# Patient Record
Sex: FEMALE | Employment: UNEMPLOYED | URBAN - METROPOLITAN AREA
[De-identification: names, ages, dates, MRNs, and addresses within clinical notes are randomized per-mention and may not be internally consistent; named-entity substitution may affect disease eponyms.]

---

## 2019-10-29 ENCOUNTER — HOSPITAL ENCOUNTER (OUTPATIENT)
Dept: MRI IMAGING | Age: 54
Discharge: HOME | End: 2019-10-29
Attending: INTERNAL MEDICINE
Payer: COMMERCIAL

## 2019-10-29 DIAGNOSIS — J44.9 COPD (CHRONIC OBSTRUCTIVE PULMONARY DISEASE) (HCC): ICD-10-CM

## 2019-10-29 DIAGNOSIS — Z87.74 S/P TOF (TETRALOGY OF FALLOT) REPAIR: ICD-10-CM

## 2019-10-29 PROCEDURE — 75557 CARDIAC MRI FOR MORPH: CPT

## 2020-02-04 LAB
HBA1C MFR BLD HPLC: 9.1 %
HBA1C MFR BLD HPLC: 9.1 %

## 2020-08-26 VITALS
HEART RATE: 82 BPM | OXYGEN SATURATION: 92 % | TEMPERATURE: 97.5 F | DIASTOLIC BLOOD PRESSURE: 87 MMHG | WEIGHT: 277.5 LBS | BODY MASS INDEX: 49.17 KG/M2 | HEIGHT: 63 IN | SYSTOLIC BLOOD PRESSURE: 150 MMHG

## 2020-08-26 PROBLEM — I10 ESSENTIAL HYPERTENSION: Status: ACTIVE | Noted: 2020-08-26

## 2020-08-26 PROBLEM — E78.2 MIXED HYPERLIPIDEMIA: Status: ACTIVE | Noted: 2020-08-26

## 2020-08-26 PROBLEM — G47.33 OBSTRUCTIVE SLEEP APNEA OF ADULT: Status: ACTIVE | Noted: 2020-08-26

## 2020-08-26 PROBLEM — E78.00 HYPERCHOLESTEROLEMIA: Status: ACTIVE | Noted: 2020-08-26

## 2020-08-26 PROBLEM — J44.9 CHRONIC OBSTRUCTIVE LUNG DISEASE (HCC): Status: ACTIVE | Noted: 2020-08-26

## 2020-08-26 PROBLEM — N83.209 CYST OF OVARY: Status: ACTIVE | Noted: 2020-08-26

## 2020-08-26 PROBLEM — N80.9 ENDOMETRIOSIS: Status: ACTIVE | Noted: 2020-08-26

## 2020-08-26 PROBLEM — L60.0 INGROWN TOENAIL: Status: ACTIVE | Noted: 2020-08-26

## 2020-08-26 PROBLEM — Z79.52 LONG TERM (CURRENT) USE OF SYSTEMIC STEROIDS: Status: ACTIVE | Noted: 2020-08-26

## 2020-08-26 PROBLEM — E66.01 MORBID OBESITY (HCC): Status: ACTIVE | Noted: 2020-08-26

## 2020-08-26 PROBLEM — Q21.3 TETRALOGY OF FALLOT: Status: ACTIVE | Noted: 2020-08-26

## 2020-08-26 PROBLEM — H66.90 CHRONIC OTITIS MEDIA: Status: ACTIVE | Noted: 2020-08-26

## 2020-08-26 PROBLEM — E11.9 DIABETES MELLITUS TYPE 2, CONTROLLED (HCC): Status: ACTIVE | Noted: 2020-08-26

## 2020-08-26 RX ORDER — CIPROFLOXACIN AND DEXAMETHASONE 3; 1 MG/ML; MG/ML
4 SUSPENSION/ DROPS AURICULAR (OTIC) 2 TIMES DAILY
COMMUNITY
End: 2020-11-25 | Stop reason: ALTCHOICE

## 2020-08-26 RX ORDER — CHOLECALCIFEROL (VITAMIN D3) 125 MCG
CAPSULE ORAL
COMMUNITY

## 2020-08-26 RX ORDER — TOBRAMYCIN AND DEXAMETHASONE 3; 1 MG/ML; MG/ML
1 SUSPENSION/ DROPS OPHTHALMIC
COMMUNITY
End: 2020-11-25 | Stop reason: ALTCHOICE

## 2020-08-26 RX ORDER — ALBUTEROL SULFATE 2.5 MG/.5ML
SOLUTION RESPIRATORY (INHALATION) ONCE
COMMUNITY
End: 2021-02-05 | Stop reason: ALTCHOICE

## 2020-08-26 RX ORDER — GLIPIZIDE 5 MG/1
TABLET ORAL 2 TIMES DAILY
COMMUNITY
End: 2020-11-25 | Stop reason: ALTCHOICE

## 2020-08-26 RX ORDER — SULFACETAMIDE SODIUM 100 MG/ML
1 SOLUTION/ DROPS OPHTHALMIC
COMMUNITY
End: 2020-11-25 | Stop reason: ALTCHOICE

## 2020-08-26 RX ORDER — MELOXICAM 7.5 MG/1
TABLET ORAL DAILY
COMMUNITY
End: 2020-11-25 | Stop reason: ALTCHOICE

## 2020-08-26 RX ORDER — FLUCONAZOLE 150 MG/1
150 TABLET ORAL DAILY
COMMUNITY
End: 2020-11-25 | Stop reason: ALTCHOICE

## 2020-08-26 RX ORDER — PEN NEEDLE, DIABETIC 30 GX3/16"
NEEDLE, DISPOSABLE MISCELLANEOUS
COMMUNITY
End: 2020-11-25 | Stop reason: SDUPTHER

## 2020-08-26 RX ORDER — LANCETS 33 GAUGE
EACH MISCELLANEOUS
COMMUNITY
End: 2020-11-25 | Stop reason: SDUPTHER

## 2020-08-26 RX ORDER — PREDNISONE 5 MG/1
TABLET ORAL
COMMUNITY
End: 2020-11-25 | Stop reason: ALTCHOICE

## 2020-08-26 RX ORDER — AMOXICILLIN AND CLAVULANATE POTASSIUM 875; 125 MG/1; MG/1
TABLET, FILM COATED ORAL EVERY 12 HOURS
COMMUNITY
End: 2020-11-25 | Stop reason: ALTCHOICE

## 2020-08-26 RX ORDER — INFLUENZA VIRUS VACCINE 15; 15; 15; 15 UG/.5ML; UG/.5ML; UG/.5ML; UG/.5ML
0.5 SUSPENSION INTRAMUSCULAR
COMMUNITY
End: 2020-11-25 | Stop reason: ALTCHOICE

## 2020-08-26 RX ORDER — METFORMIN HYDROCHLORIDE 500 MG/1
TABLET, FILM COATED, EXTENDED RELEASE ORAL
COMMUNITY
End: 2020-11-25 | Stop reason: ALTCHOICE

## 2020-08-26 RX ORDER — LEVOFLOXACIN 500 MG/1
TABLET, FILM COATED ORAL DAILY
COMMUNITY
End: 2021-08-26 | Stop reason: ALTCHOICE

## 2020-08-26 RX ORDER — LOSARTAN POTASSIUM 100 MG/1
100 TABLET ORAL DAILY
COMMUNITY

## 2020-08-26 RX ORDER — GLIMEPIRIDE 2 MG/1
TABLET ORAL
COMMUNITY
End: 2020-11-25 | Stop reason: SDUPTHER

## 2020-08-26 RX ORDER — PROMETHAZINE HYDROCHLORIDE AND CODEINE PHOSPHATE 6.25; 1 MG/5ML; MG/5ML
SOLUTION ORAL
COMMUNITY
End: 2020-11-25 | Stop reason: ALTCHOICE

## 2020-08-26 RX ORDER — METHYLPREDNISOLONE 4 MG/1
TABLET ORAL
COMMUNITY
End: 2020-11-25 | Stop reason: ALTCHOICE

## 2020-08-26 RX ORDER — CEFDINIR 300 MG/1
300 CAPSULE ORAL 2 TIMES DAILY
COMMUNITY
End: 2020-11-25 | Stop reason: ALTCHOICE

## 2020-08-26 RX ORDER — DOXYCYCLINE 100 MG/1
100 CAPSULE ORAL 2 TIMES DAILY
COMMUNITY
End: 2020-11-25 | Stop reason: ALTCHOICE

## 2020-08-26 RX ORDER — METFORMIN HYDROCHLORIDE 500 MG/1
TABLET ORAL 2 TIMES DAILY WITH MEALS
COMMUNITY
End: 2020-11-25 | Stop reason: ALTCHOICE

## 2020-08-26 RX ORDER — CHLORPHENIRAMINE MALEATE 4 MG
TABLET ORAL 2 TIMES DAILY
COMMUNITY
End: 2020-11-25 | Stop reason: ALTCHOICE

## 2020-08-26 RX ORDER — INSULIN GLARGINE 100 [IU]/ML
INJECTION, SOLUTION SUBCUTANEOUS
COMMUNITY
End: 2020-09-29 | Stop reason: SDUPTHER

## 2020-08-26 RX ORDER — FLUOCINOLONE ACETONIDE 0.11 MG/ML
OIL AURICULAR (OTIC)
COMMUNITY
End: 2020-11-25 | Stop reason: ALTCHOICE

## 2020-08-26 RX ORDER — SILVER SULFADIAZINE 10 G/1000G
CREAM TOPICAL DAILY
COMMUNITY
End: 2020-11-25 | Stop reason: ALTCHOICE

## 2020-08-26 RX ORDER — IPRATROPIUM BROMIDE AND ALBUTEROL SULFATE 2.5; .5 MG/3ML; MG/3ML
3 SOLUTION RESPIRATORY (INHALATION)
COMMUNITY
End: 2020-11-25 | Stop reason: ALTCHOICE

## 2020-08-26 RX ORDER — FLUCONAZOLE 100 MG/1
200 TABLET ORAL DAILY
COMMUNITY
End: 2020-11-25 | Stop reason: ALTCHOICE

## 2020-08-26 RX ORDER — ZOLPIDEM TARTRATE 12.5 MG/1
12.5 TABLET, FILM COATED, EXTENDED RELEASE ORAL
COMMUNITY
End: 2022-09-16

## 2020-08-26 RX ORDER — NEBIVOLOL HYDROCHLORIDE 5 MG/1
TABLET ORAL DAILY
COMMUNITY

## 2020-08-26 RX ORDER — ZOSTER VACCINE RECOMBINANT, ADJUVANTED 50 MCG/0.5
0.5 KIT INTRAMUSCULAR ONCE
COMMUNITY
End: 2020-11-25 | Stop reason: ALTCHOICE

## 2020-08-26 RX ORDER — UMECLIDINIUM BROMIDE AND VILANTEROL TRIFENATATE 62.5; 25 UG/1; UG/1
1 POWDER RESPIRATORY (INHALATION) DAILY
COMMUNITY
End: 2020-11-25 | Stop reason: ALTCHOICE

## 2020-08-26 RX ORDER — ALBUTEROL SULFATE 90 UG/1
AEROSOL, METERED RESPIRATORY (INHALATION)
COMMUNITY

## 2020-08-26 RX ORDER — PREDNISONE 20 MG/1
TABLET ORAL
COMMUNITY
End: 2020-11-25 | Stop reason: ALTCHOICE

## 2020-08-26 RX ORDER — FUROSEMIDE 20 MG/1
TABLET ORAL DAILY
COMMUNITY
End: 2021-02-05 | Stop reason: ALTCHOICE

## 2020-08-26 RX ORDER — CIPROFLOXACIN 500 MG/1
TABLET ORAL 2 TIMES DAILY
COMMUNITY
End: 2020-11-25 | Stop reason: ALTCHOICE

## 2020-08-26 RX ORDER — CYCLOBENZAPRINE HCL 10 MG
TABLET ORAL
COMMUNITY
End: 2022-01-10

## 2020-08-26 RX ORDER — ATENOLOL 50 MG/1
TABLET ORAL DAILY
COMMUNITY
End: 2020-11-25 | Stop reason: ALTCHOICE

## 2020-08-26 RX ORDER — BLOOD SUGAR DIAGNOSTIC
STRIP MISCELLANEOUS SEE ADMIN INSTRUCTIONS
COMMUNITY
End: 2020-11-25 | Stop reason: SDUPTHER

## 2020-08-26 RX ORDER — FLUTICASONE PROPIONATE 110 UG/1
AEROSOL, METERED RESPIRATORY (INHALATION)
COMMUNITY
End: 2020-11-25 | Stop reason: ALTCHOICE

## 2020-08-26 RX ORDER — ERGOCALCIFEROL 1.25 MG/1
50000 CAPSULE ORAL
COMMUNITY
End: 2020-11-25 | Stop reason: ALTCHOICE

## 2020-08-26 RX ORDER — ATORVASTATIN CALCIUM 40 MG/1
TABLET, FILM COATED ORAL DAILY
COMMUNITY
End: 2021-02-05 | Stop reason: DRUGHIGH

## 2020-08-26 RX ORDER — METRONIDAZOLE 500 MG/1
TABLET ORAL 3 TIMES DAILY
COMMUNITY
End: 2020-11-25 | Stop reason: ALTCHOICE

## 2020-09-10 ENCOUNTER — TELEPHONE (OUTPATIENT)
Dept: ENDOCRINOLOGY | Age: 55
End: 2020-09-10

## 2020-09-10 LAB
CHOLEST SERPL-MCNC: 188 MG/DL (ref 100–199)
HBA1C MFR BLD: 8.9 % (ref 4.8–5.6)
HDLC SERPL-MCNC: 46 MG/DL
LDLC SERPL CALC-MCNC: 114 MG/DL (ref 0–99)
TRIGL SERPL-MCNC: 159 MG/DL (ref 0–149)
VLDLC SERPL CALC-MCNC: 28 MG/DL (ref 5–40)

## 2020-09-10 NOTE — TELEPHONE ENCOUNTER
I returned the patient call from yesterday. She said that she had her labs done and wanted to know if her results were back. I advised her that they are in and that I will give her a call when Dr. Stephane Bangura sends me her results.

## 2020-09-18 NOTE — PROGRESS NOTES
Please inform patient that her A1C is 8.9%. It was 9.1% in 2/2020. Please bring glucose log to next visit. Bad cholesterol is better than before but still higher than we target. Make sure to take atorvastatin 40 mg regularly. It should be taken at bedtime and work on low fat diet.

## 2020-09-23 ENCOUNTER — TELEPHONE (OUTPATIENT)
Dept: ENDOCRINOLOGY | Age: 55
End: 2020-09-23

## 2020-09-23 DIAGNOSIS — E11.65 TYPE 2 DIABETES MELLITUS WITH HYPERGLYCEMIA, WITH LONG-TERM CURRENT USE OF INSULIN (HCC): Primary | ICD-10-CM

## 2020-09-23 DIAGNOSIS — Z79.4 TYPE 2 DIABETES MELLITUS WITH HYPERGLYCEMIA, WITH LONG-TERM CURRENT USE OF INSULIN (HCC): Primary | ICD-10-CM

## 2020-09-23 NOTE — TELEPHONE ENCOUNTER
Patient said that her sugar in the mornings run between 140-180. Her sugar was 285 at 6pm the other night. She said that she was seen at [facility] for chest pain, they adjusted her meds and then told her to continue all her meds that you have her on. She's taking 15 units of basaglar, she takes her glimepiride like she is suppose to but isnt sure why her sugar is running high. She said that she have been working on her diet.    ----- Message from Jessica Lennon MD sent at 9/18/2020  2:19 PM EDT -----  Please inform patient that her A1C is 8.9%. It was 9.1% in 2/2020. Please bring glucose log to next visit. Bad cholesterol is better than before but still higher than we target. Make sure to take atorvastatin 40 mg regularly. It should be taken at bedtime and work on low fat diet.

## 2020-09-24 NOTE — TELEPHONE ENCOUNTER
She said that you took her off the Glipizide 5mg and started her on the Glimepiride due to the glipizide causing her stomach pains. She said that we may have to notify [facility] of her increase of insulin due to her increase in units.

## 2020-09-24 NOTE — TELEPHONE ENCOUNTER
Please advise patient to increase Basaglar to 20 units daily. Continue glipizide 5 mg twice a day. Check glucose twice a day everyday: before breakfast and lunch and bring meter/log to next appointment (please set up appointment for 11/2020).

## 2020-09-29 RX ORDER — INSULIN GLARGINE 100 [IU]/ML
INJECTION, SOLUTION SUBCUTANEOUS
Qty: 2 ADJUSTABLE DOSE PRE-FILLED PEN SYRINGE | Refills: 2 | Status: SHIPPED | OUTPATIENT
Start: 2020-09-29 | End: 2020-11-25 | Stop reason: SDUPTHER

## 2020-11-06 ENCOUNTER — TELEPHONE (OUTPATIENT)
Dept: ENDOCRINOLOGY | Age: 55
End: 2020-11-06

## 2020-11-06 NOTE — TELEPHONE ENCOUNTER
Called patient to see if she picked up her new insulin script and if she was tolerating it well. Patient said yes.    ----- Message from Claude Peralta MD sent at 9/18/2020  2:19 PM EDT -----  Please inform patient that her A1C is 8.9%. It was 9.1% in 2/2020. Please bring glucose log to next visit. Bad cholesterol is better than before but still higher than we target. Make sure to take atorvastatin 40 mg regularly. It should be taken at bedtime and work on low fat diet.

## 2020-11-16 VITALS
OXYGEN SATURATION: 92 % | TEMPERATURE: 97.5 F | WEIGHT: 277.5 LBS | BODY MASS INDEX: 49.17 KG/M2 | HEART RATE: 82 BPM | SYSTOLIC BLOOD PRESSURE: 150 MMHG | DIASTOLIC BLOOD PRESSURE: 87 MMHG | HEIGHT: 63 IN

## 2020-11-16 VITALS
SYSTOLIC BLOOD PRESSURE: 148 MMHG | HEART RATE: 84 BPM | WEIGHT: 275.4 LBS | BODY MASS INDEX: 47.02 KG/M2 | HEIGHT: 64 IN | DIASTOLIC BLOOD PRESSURE: 82 MMHG | OXYGEN SATURATION: 94 %

## 2020-11-16 PROBLEM — G47.33 OBSTRUCTIVE SLEEP APNEA OF ADULT: Status: ACTIVE | Noted: 2020-11-16

## 2020-11-16 PROBLEM — E66.01 MORBID OBESITY (HCC): Status: ACTIVE | Noted: 2020-11-16

## 2020-11-16 PROBLEM — I10 ESSENTIAL HYPERTENSION: Status: ACTIVE | Noted: 2020-11-16

## 2020-11-16 PROBLEM — L60.0 INGROWING TOENAIL: Status: ACTIVE | Noted: 2020-11-16

## 2020-11-16 PROBLEM — E78.2 MIXED HYPERLIPIDEMIA: Status: ACTIVE | Noted: 2020-11-16

## 2020-11-16 PROBLEM — E78.00 HYPERCHOLESTEROLEMIA: Status: ACTIVE | Noted: 2020-11-16

## 2020-11-16 PROBLEM — Z79.52 LONG TERM (CURRENT) USE OF SYSTEMIC STEROIDS: Status: ACTIVE | Noted: 2020-11-16

## 2020-11-16 PROBLEM — H66.90 CHRONIC OTITIS MEDIA: Status: ACTIVE | Noted: 2020-11-16

## 2020-11-16 PROBLEM — J44.9 CHRONIC OBSTRUCTIVE LUNG DISEASE (HCC): Status: ACTIVE | Noted: 2020-11-16

## 2020-11-16 PROBLEM — E11.9 TYPE 2 DIABETES MELLITUS (HCC): Status: ACTIVE | Noted: 2020-11-16

## 2020-11-16 RX ORDER — LOSARTAN POTASSIUM 100 MG/1
100 TABLET ORAL DAILY
COMMUNITY

## 2020-11-16 RX ORDER — CIPROFLOXACIN AND DEXAMETHASONE 3; 1 MG/ML; MG/ML
4 SUSPENSION/ DROPS AURICULAR (OTIC) 2 TIMES DAILY
COMMUNITY
End: 2022-09-16

## 2020-11-16 RX ORDER — CYCLOBENZAPRINE HCL 10 MG
TABLET ORAL
COMMUNITY

## 2020-11-16 RX ORDER — LANCETS 33 GAUGE
EACH MISCELLANEOUS
COMMUNITY
End: 2022-09-16

## 2020-11-16 RX ORDER — PAROXETINE 7.5 MG/1
CAPSULE ORAL
COMMUNITY
End: 2022-09-16

## 2020-11-16 RX ORDER — AMLODIPINE BESYLATE 5 MG/1
5 TABLET ORAL DAILY
COMMUNITY

## 2020-11-16 RX ORDER — NYSTATIN 100000 [USP'U]/ML
SUSPENSION ORAL 4 TIMES DAILY
COMMUNITY
End: 2020-11-25 | Stop reason: ALTCHOICE

## 2020-11-16 RX ORDER — OFLOXACIN 3 MG/ML
5 SOLUTION AURICULAR (OTIC) DAILY
COMMUNITY
End: 2020-11-25 | Stop reason: ALTCHOICE

## 2020-11-16 RX ORDER — ALBUTEROL SULFATE 2.5 MG/.5ML
SOLUTION RESPIRATORY (INHALATION) ONCE
COMMUNITY
End: 2022-01-10

## 2020-11-16 RX ORDER — GLIMEPIRIDE 2 MG/1
TABLET ORAL
COMMUNITY
End: 2022-01-10

## 2020-11-16 RX ORDER — FLUTICASONE PROPIONATE 110 UG/1
AEROSOL, METERED RESPIRATORY (INHALATION)
COMMUNITY
End: 2022-09-16

## 2020-11-16 RX ORDER — PREDNISONE 20 MG/1
TABLET ORAL
COMMUNITY
End: 2022-01-10

## 2020-11-16 RX ORDER — PREDNISONE 5 MG/1
TABLET ORAL
COMMUNITY
End: 2022-01-10

## 2020-11-16 RX ORDER — TOBRAMYCIN AND DEXAMETHASONE 3; 1 MG/ML; MG/ML
1 SUSPENSION/ DROPS OPHTHALMIC
COMMUNITY

## 2020-11-16 RX ORDER — ATORVASTATIN CALCIUM 40 MG/1
TABLET, FILM COATED ORAL DAILY
COMMUNITY
End: 2022-01-10

## 2020-11-16 RX ORDER — PREDNISONE 10 MG/1
TABLET ORAL
COMMUNITY
End: 2022-01-10

## 2020-11-16 RX ORDER — ATENOLOL 50 MG/1
TABLET ORAL DAILY
COMMUNITY
End: 2022-09-16

## 2020-11-16 RX ORDER — PEN NEEDLE, DIABETIC 30 GX3/16"
NEEDLE, DISPOSABLE MISCELLANEOUS
COMMUNITY
End: 2022-09-16

## 2020-11-16 RX ORDER — ALBUTEROL SULFATE 90 UG/1
AEROSOL, METERED RESPIRATORY (INHALATION)
COMMUNITY
End: 2022-01-10

## 2020-11-16 RX ORDER — ZOLPIDEM TARTRATE 12.5 MG/1
12.5 TABLET, FILM COATED, EXTENDED RELEASE ORAL
COMMUNITY
End: 2022-01-10

## 2020-11-16 RX ORDER — ZOSTER VACCINE RECOMBINANT, ADJUVANTED 50 MCG/0.5
0.5 KIT INTRAMUSCULAR ONCE
COMMUNITY

## 2020-11-16 RX ORDER — ACETAMINOPHEN 500 MG
2 TABLET ORAL AS NEEDED
COMMUNITY
End: 2020-11-25 | Stop reason: ALTCHOICE

## 2020-11-16 RX ORDER — CIPROFLOXACIN 500 MG/1
TABLET ORAL 2 TIMES DAILY
COMMUNITY
End: 2022-09-16

## 2020-11-16 RX ORDER — FLUTICASONE PROPIONATE 50 MCG
2 SPRAY, SUSPENSION (ML) NASAL DAILY
COMMUNITY
End: 2020-11-25 | Stop reason: ALTCHOICE

## 2020-11-16 RX ORDER — INSULIN GLARGINE 100 [IU]/ML
INJECTION, SOLUTION SUBCUTANEOUS
COMMUNITY
End: 2022-09-16

## 2020-11-16 RX ORDER — MELOXICAM 7.5 MG/1
TABLET ORAL DAILY
COMMUNITY
End: 2022-09-16

## 2020-11-16 RX ORDER — PROMETHAZINE HYDROCHLORIDE AND CODEINE PHOSPHATE 6.25; 1 MG/5ML; MG/5ML
SOLUTION ORAL
COMMUNITY
End: 2022-01-10

## 2020-11-16 RX ORDER — FUROSEMIDE 20 MG/1
TABLET ORAL DAILY
COMMUNITY
End: 2022-01-10

## 2020-11-16 RX ORDER — IPRATROPIUM BROMIDE AND ALBUTEROL SULFATE 2.5; .5 MG/3ML; MG/3ML
3 SOLUTION RESPIRATORY (INHALATION)
COMMUNITY

## 2020-11-16 RX ORDER — METRONIDAZOLE 500 MG/1
TABLET ORAL 3 TIMES DAILY
COMMUNITY
End: 2022-09-16

## 2020-11-16 RX ORDER — GLIPIZIDE 5 MG/1
TABLET ORAL 2 TIMES DAILY
COMMUNITY
End: 2022-01-10

## 2020-11-16 RX ORDER — DOXYCYCLINE 100 MG/1
100 CAPSULE ORAL 2 TIMES DAILY
COMMUNITY
End: 2020-11-25 | Stop reason: ALTCHOICE

## 2020-11-16 RX ORDER — PNEUMOCOCCAL VACCINE POLYVALENT 25; 25; 25; 25; 25; 25; 25; 25; 25; 25; 25; 25; 25; 25; 25; 25; 25; 25; 25; 25; 25; 25; 25 UG/.5ML; UG/.5ML; UG/.5ML; UG/.5ML; UG/.5ML; UG/.5ML; UG/.5ML; UG/.5ML; UG/.5ML; UG/.5ML; UG/.5ML; UG/.5ML; UG/.5ML; UG/.5ML; UG/.5ML; UG/.5ML; UG/.5ML; UG/.5ML; UG/.5ML; UG/.5ML; UG/.5ML; UG/.5ML; UG/.5ML
0.5 INJECTION, SOLUTION INTRAMUSCULAR; SUBCUTANEOUS
COMMUNITY

## 2020-11-16 RX ORDER — FLUCONAZOLE 150 MG/1
150 TABLET ORAL DAILY
COMMUNITY
End: 2022-09-16

## 2020-11-16 RX ORDER — BLOOD SUGAR DIAGNOSTIC
STRIP MISCELLANEOUS SEE ADMIN INSTRUCTIONS
COMMUNITY
End: 2022-09-16

## 2020-11-25 ENCOUNTER — OFFICE VISIT (OUTPATIENT)
Dept: ENDOCRINOLOGY | Age: 55
End: 2020-11-25
Payer: COMMERCIAL

## 2020-11-25 VITALS
TEMPERATURE: 98.6 F | OXYGEN SATURATION: 92 % | BODY MASS INDEX: 46.85 KG/M2 | WEIGHT: 274.4 LBS | SYSTOLIC BLOOD PRESSURE: 152 MMHG | HEIGHT: 64 IN | DIASTOLIC BLOOD PRESSURE: 81 MMHG | HEART RATE: 75 BPM

## 2020-11-25 DIAGNOSIS — Z79.4 TYPE 2 DIABETES MELLITUS WITH HYPERGLYCEMIA, WITH LONG-TERM CURRENT USE OF INSULIN (HCC): Primary | ICD-10-CM

## 2020-11-25 DIAGNOSIS — E11.65 TYPE 2 DIABETES MELLITUS WITH HYPERGLYCEMIA, WITH LONG-TERM CURRENT USE OF INSULIN (HCC): Primary | ICD-10-CM

## 2020-11-25 LAB — GLUCOSE POC: 101 MG/DL

## 2020-11-25 PROCEDURE — 82962 GLUCOSE BLOOD TEST: CPT | Performed by: INTERNAL MEDICINE

## 2020-11-25 PROCEDURE — 99214 OFFICE O/P EST MOD 30 MIN: CPT | Performed by: INTERNAL MEDICINE

## 2020-11-25 RX ORDER — BLOOD SUGAR DIAGNOSTIC
STRIP MISCELLANEOUS
Qty: 100 STRIP | Refills: 3 | Status: SHIPPED | OUTPATIENT
Start: 2020-11-25 | End: 2021-02-05 | Stop reason: SDUPTHER

## 2020-11-25 RX ORDER — METOPROLOL SUCCINATE 100 MG/1
TABLET, EXTENDED RELEASE ORAL
COMMUNITY
Start: 2020-10-30

## 2020-11-25 RX ORDER — LANCETS 33 GAUGE
EACH MISCELLANEOUS
Qty: 100 LANCET | Refills: 3 | Status: SHIPPED | OUTPATIENT
Start: 2020-11-25 | End: 2021-02-05 | Stop reason: SDUPTHER

## 2020-11-25 RX ORDER — PEN NEEDLE, DIABETIC 30 GX3/16"
NEEDLE, DISPOSABLE MISCELLANEOUS
Qty: 1 PACKAGE | Refills: 3 | Status: SHIPPED | OUTPATIENT
Start: 2020-11-25 | End: 2020-12-28 | Stop reason: SDUPTHER

## 2020-11-25 RX ORDER — INSULIN GLARGINE 100 [IU]/ML
INJECTION, SOLUTION SUBCUTANEOUS
Qty: 2 ADJUSTABLE DOSE PRE-FILLED PEN SYRINGE | Refills: 3 | Status: SHIPPED | OUTPATIENT
Start: 2020-11-25 | End: 2020-12-28 | Stop reason: SDUPTHER

## 2020-11-25 RX ORDER — PREDNISONE 5 MG/1
TABLET ORAL
COMMUNITY
End: 2022-09-16

## 2020-11-25 RX ORDER — TRAMADOL HYDROCHLORIDE 50 MG/1
TABLET ORAL
COMMUNITY
Start: 2020-09-15 | End: 2022-09-16

## 2020-11-25 RX ORDER — GLIMEPIRIDE 2 MG/1
2 TABLET ORAL 2 TIMES DAILY
Qty: 60 TAB | Refills: 3 | Status: SHIPPED | OUTPATIENT
Start: 2020-11-25 | End: 2020-12-25

## 2020-11-25 RX ORDER — PAROXETINE 7.5 MG/1
CAPSULE ORAL
COMMUNITY
Start: 2020-11-23 | End: 2021-08-26 | Stop reason: ALTCHOICE

## 2020-11-25 RX ORDER — FLUTICASONE FUROATE, UMECLIDINIUM BROMIDE AND VILANTEROL TRIFENATATE 100; 62.5; 25 UG/1; UG/1; UG/1
POWDER RESPIRATORY (INHALATION)
COMMUNITY
Start: 2020-11-13

## 2020-11-25 NOTE — PROGRESS NOTES
History and Physical    Patient: Alvin King MRN: [id number]  SSN: [id number]    YOB: 1965  Age: 50 y.o. Sex: female      Subjective:      Alvin King is a 50 y.o. female with a past mental history of hypertension, hyperlipidemia, O2 and steroid dependent COPD is here for follow up of type 2 diabetes mellitus. She is in primary care of Dr. Chrys Buerger. patient was diagnosed with diabetes in May 2019 during labs done as a part of annual physical.   At the last visit we had continued Basaglar 15 units daily and we stopped Januvia because patient could not afford it and she was started on glipizide 5 mg twice a day. However, for some reason she is on glimepiride 2 mg twice a day. Blood glucose was running high, so we increased Basaglar to 20 units daily. Patient is trying to follow diabetic diet, but sometimes she eats wafers, sugar-free cookies, etc. she denies any low blood glucose. She checks blood glucose usually once a day because her fingers hurt. Glucometer reading: Checking blood glucose 1-2 times per day.   Readings are ranging from 97 to 242 mg/dL    Updated diabetes history:  · Diagnosis: 1 month back (5/2019)    · Current treatment: Basaglar 20 units daily, glimepiride 2 mg twice a day    · Past treatment: metformin, metformin ER (nausea and stomach pain), Basaglar, Januvia (expensive)    · Glucose checks: twice a day as above    · Hyperglycemia: no    · Hypoglycemia: no    · Meals per day: 3, breakfast: greek yogurt or oatmeal, lunch: jenny calenders/ burgers or sandwich, dinner: steak/chicken breast, vegetables, snacks: apples, orange, cantaloupe, popsicle, pretzels, chips, sugar free candy, regular soda rarely    · Exercise: walks at home on treadmill    · DM related hospitalizations: no        Complications of DM:    · CAD: no    · CVA: no    · PVD: no    · Amputations: no     · Retinopathy: no; last exam was February 2020    · Gastropathy: no    · Nephropathy: no    · Neuropathy: no        Medications:    · Statin: Atorvastatin 40    · ACE-I: losartan    · ASA: no        · Diabetes education: no    Past Medical History:   Diagnosis Date    Body mass index 40.0-44.9, adult ([facility] 2020    [facility] disease [facility] 2020    Chronic otitis media 2020    Cyst of ovary 2020    Diabetes mellitus type 2, controlled ([facility] 2020    Endometriosis 2020    Essential hypertension 2020    Hypercholesterolemia 2020    Ingrown toenail 2020    Long term (current) use of systemic steroids 2020    Mixed hyperlipidemia 2020    Morbid obesity ([facility] 2020    Obesity     Obstructive sleep apnea of adult 2020    Tetralogy of Fallot 2020    Uncontrolled type 2 diabetes mellitus ([facility] 2020     Past Surgical History:   Procedure Laterality Date    CARDIAC SURG PROCEDURE UNLIST      HX BACK SURGERY      HX  SECTION      x2    HX HYSTERECTOMY      HX TONSILLECTOMY      HX TONSILLECTOMY      HX TUBAL LIGATION        Family History   Problem Relation Age of Onset    Diabetes Mother      Social History     Tobacco Use    Smoking status: Former Smoker     Years: 30.00    Smokeless tobacco: Never Used   Substance Use Topics    Alcohol use: Not Currently      Prior to Admission medications    Medication Sig Start Date End Date Taking? Authorizing Provider   Abe David 100-62.5-25 mcg inhaler inhale 1 puff by mouth and INTO THE LUNGS once daily Rinse mouth after use 20  Yes Provider, Historical   metoprolol succinate (TOPROL-XL) 100 mg tablet take 1 tablet by mouth once daily 10/30/20  Yes Provider, Historical   traMADoL (ULTRAM) 50 mg tablet take 1 tablet by mouth three times a day if needed for Breakthrough pain 9/15/20  Yes Provider, Historical   PARoxetine mesylate,menop.sym, 7.5 mg cap  20  Yes Provider, Historical   predniSONE (DELTASONE) 5 mg tablet Take  by mouth. Yes Provider, Historical   insulin glargine (Basaglar KwikPen U-100 Insulin) 100 unit/mL (3 mL) inpn Inject 20 units daily 11/25/20  Yes Mercedes Alexander MD   Insulin Needles, Disposable, (BD Ultra-Fine Short Pen Needle) 31 gauge x 5/16\" ndle Use to take insulin once a day 11/25/20  Yes Mercedes Alexander MD   glimepiride (AMARYL) 2 mg tablet Take 1 Tab by mouth two (2) times a day for 30 days. 11/25/20 12/25/20 Yes Mercedes Alexander MD   lancets (OneTouch Delica Plus Lancet) 33 gauge misc Use to check glucose twice a day 11/25/20  Yes Mercedes Alexander MD   glucose blood VI test strips (OneTouch Verio test strips) strip Use to check glucose twice a day 11/25/20  Yes Mercedes Alexander MD   amLODIPine (NORVASC) 5 mg tablet Take 5 mg by mouth daily. Yes Provider, Historical   albuterol sulfate (PROVENTIL;VENTOLIN) 2.5 mg/0.5 mL nebu nebulizer solution by Nebulization route once. Yes Provider, Historical   atorvastatin (LIPITOR) 40 mg tablet Take  by mouth daily. Yes Provider, Historical   glycopyrrolate-formoteroL (Bevespi Aerosphere) 9-4.8 mcg HFA inhaler Take 2 Puffs by inhalation two (2) times a day. Yes Provider, Historical   cyclobenzaprine (FLEXERIL) 10 mg tablet Take  by mouth three (3) times daily as needed for Muscle Spasm(s). Yes Provider, Historical   furosemide (LASIX) 20 mg tablet Take  by mouth daily. Yes Provider, Historical   losartan (COZAAR) 100 mg tablet Take 100 mg by mouth daily. Yes Provider, Historical   albuterol (ProAir HFA) 90 mcg/actuation inhaler Take  by inhalation. Yes Provider, Historical   zolpidem CR (AMBIEN CR) 12.5 mg tablet Take 12.5 mg by mouth nightly as needed for Sleep. Yes Provider, Historical   levoFLOXacin (LEVAQUIN) 500 mg tablet Take  by mouth daily. Yes Provider, Historical   cholecalciferol, vitamin D3, (Vitamin D3) 50 mcg (2,000 unit) tab Take  by mouth. Yes Provider, Historical   nebivoloL (Bystolic) 5 mg tablet Take  by mouth daily.    Yes Provider, Historical No Known Allergies    Review of Systems:  ROS    A comprehensive review of systems was preformed and it is negative except mentioned in HPI    Objective:     Vitals:    11/25/20 1522   BP: (!) 152/81   Pulse: 75   Temp: 98.6 °F (37 °C)   TempSrc: Temporal   SpO2: 92%   Weight: 274 lb 6.4 oz (124.5 kg)   Height: 5' 3.5\" (1.613 m)        Physical Exam:    Physical Exam  Vitals signs and nursing note reviewed. Constitutional:       Appearance: Normal appearance. HENT:      Head: Normocephalic and atraumatic. Cardiovascular:      Rate and Rhythm: Normal rate and regular rhythm. Pulmonary:      Effort: Pulmonary effort is normal.      Breath sounds: Normal breath sounds. Neurological:      Mental Status: She is alert. Labs and Imaging:  Results for Graeme Erazo (MRN [id number]) as of 11/25/2020 15:29   Ref.  Range 9/9/2020 11:34   Triglyceride Latest Ref Range: 0 - 149 mg/dL 159 (H)   Cholesterol, total Latest Ref Range: 100 - 199 mg/dL 188   HDL Cholesterol Latest Ref Range: >39 mg/dL 46   LDL Cholesterol Latest Ref Range: 0 - 99 mg/dL 114 (H)   Hemoglobin A1c, (calculated) Latest Ref Range: 4.8 - 5.6 % 8.9 (H)     Last 3 Recorded Weights in this Encounter    11/25/20 1522   Weight: 274 lb 6.4 oz (124.5 kg)        Lab Results   Component Value Date/Time    Hemoglobin A1c 8.9 (H) 09/09/2020 11:34 AM    Hemoglobin A1c, External 9.1 02/04/2020        Assessment:     Patient Active Problem List   Diagnosis Code    Body mass index 40.0-44.9, adult ([address] Z68.41    Chronic obstructive lung disease ([address] J44.9    Essential hypertension I10    Hypercholesterolemia E78.00    Ingrown toenail L60.0    Long term (current) use of systemic steroids Z79.52    Mixed hyperlipidemia E78.2    Morbid obesity ([address] E66.01    Obstructive sleep apnea of adult G47.33    Diabetes mellitus type 2, controlled ([address] E11.9    Uncontrolled type 2 diabetes mellitus ([address] E11.65    Obesity E66.9    Chronic otitis media H66.90    Cyst of ovary N83.209    Endometriosis N80.9    Tetralogy of Fallot Q21.3           Plan:     type II diabetes mellitus uncontrolled  Hemoglobin A1c was 9.5% on 5-, 6.4% on 8-, 9.1% on 2-4-2020, 8.9% on 9-9-2020. Fingerstick blood glucose is 101 mg/dL in my office today. Up to date with diabetes related annual labs: yes    Up to date with diabetic eye exam: yes    plan:  It appears that glucose control has improved since we increased Basaglar dose. Encouraged patient to follow diabetic diet. She is unable to exercise because of COPD. Continue Basaglar 20 units daily. Continue glimepiride 2 mg twice a day, always to be taken before eating. Check blood glucose 1-2 times per day and I will see her back in 2 months. not a good candidate for SGLT-2 inhibitor because of frequent UTI and vaginal yeast infections, not a good candidate for pioglitazone because of obesity, unable to tolerate metformin and metformin ER, not a candidate for GLP-1 agonist because she is on Januvia.    essential hypertension  Running normal at home. No microalbuminuria. Continue current medications. mixed hyperlipidemia  1-: LDL elevated at 128. Increased atorvastatin from 20 mg to 40 mg. LDL repeated in September 2020 was better but still elevated at 114. I will increase atorvastatin to 80 mg at next visit.    elevated liver enzymes level  ALT slightly elevated at 37 on 5-. morbid obesity  diet, unable to exercise. body mass index 40+ - severely obese    ingrowing toenail  Has been taken care of by podiatrist.    long-term current use of systemic steroid  missed appointment for DEXA. Patient is consuming 2-3 servings of dairy per day. vit D was low at 12 in 6/2019. unable to tolerate ergocalciferol and cholecalciferol. Advised patient to make appointment soon and advise provider to forward result to me.     obstructive sleep apnea of adult  Unable to use CPAP because of sinus infection. Patient's pulmonologist is aware. Orders Placed This Encounter    AMB POC GLUCOSE BLOOD, BY GLUCOSE MONITORING DEVICE    insulin glargine (Basaglar KwikPen U-100 Insulin) 100 unit/mL (3 mL) inpn     Sig: Inject 20 units daily     Dispense:  2 Adjustable Dose Pre-filled Pen Syringe     Refill:  3    Insulin Needles, Disposable, (BD Ultra-Fine Short Pen Needle) 31 gauge x 5/16\" ndle     Sig: Use to take insulin once a day     Dispense:  1 Package     Refill:  3    glimepiride (AMARYL) 2 mg tablet     Sig: Take 1 Tab by mouth two (2) times a day for 30 days.      Dispense:  60 Tab     Refill:  3    lancets (OneTouch Delica Plus Lancet) 33 gauge misc     Sig: Use to check glucose twice a day     Dispense:  100 Lancet     Refill:  3    glucose blood VI test strips (OneTouch Verio test strips) strip     Sig: Use to check glucose twice a day     Dispense:  100 Strip     Refill:  3        Signed By: Rea Pizarro MD     November 25, 2020      Return to clinic 2 months

## 2020-12-28 ENCOUNTER — TELEPHONE (OUTPATIENT)
Dept: ENDOCRINOLOGY | Age: 55
End: 2020-12-28

## 2020-12-28 DIAGNOSIS — Z79.4 TYPE 2 DIABETES MELLITUS WITH HYPERGLYCEMIA, WITH LONG-TERM CURRENT USE OF INSULIN (HCC): Primary | ICD-10-CM

## 2020-12-28 DIAGNOSIS — E11.65 TYPE 2 DIABETES MELLITUS WITH HYPERGLYCEMIA, WITH LONG-TERM CURRENT USE OF INSULIN (HCC): Primary | ICD-10-CM

## 2020-12-28 DIAGNOSIS — E11.65 TYPE 2 DIABETES MELLITUS WITH HYPERGLYCEMIA, WITH LONG-TERM CURRENT USE OF INSULIN (HCC): ICD-10-CM

## 2020-12-28 DIAGNOSIS — Z79.4 TYPE 2 DIABETES MELLITUS WITH HYPERGLYCEMIA, WITH LONG-TERM CURRENT USE OF INSULIN (HCC): ICD-10-CM

## 2020-12-28 RX ORDER — INSULIN GLARGINE 100 [IU]/ML
INJECTION, SOLUTION SUBCUTANEOUS
Qty: 3 ADJUSTABLE DOSE PRE-FILLED PEN SYRINGE | Refills: 3 | Status: SHIPPED | OUTPATIENT
Start: 2020-12-28 | End: 2021-02-05 | Stop reason: SDUPTHER

## 2020-12-28 RX ORDER — INSULIN ASPART 100 [IU]/ML
INJECTION, SOLUTION INTRAVENOUS; SUBCUTANEOUS
Qty: 3 ADJUSTABLE DOSE PRE-FILLED PEN SYRINGE | Refills: 3 | Status: SHIPPED | OUTPATIENT
Start: 2020-12-28 | End: 2020-12-29 | Stop reason: CLARIF

## 2020-12-28 RX ORDER — PEN NEEDLE, DIABETIC 30 GX3/16"
NEEDLE, DISPOSABLE MISCELLANEOUS
Qty: 200 PACKAGE | Refills: 3 | Status: SHIPPED | OUTPATIENT
Start: 2020-12-28 | End: 2022-09-16

## 2020-12-28 NOTE — TELEPHONE ENCOUNTER
Spoke to patient. Blood glucose has been running from 250-300s even without steroids. She was started on Medrol Dosepak today for COPD exacerbation. She is checking blood glucose 3 times a day. Advised patient that she appears to stress induced worsening of blood sugars and she is going to start steroids so there is going to be further worsening. Increase Basaglar to 30 units daily. Start NovoLog mid dose correction factor III times a day before meals. Stop glimepiride when she starts NovoLog. Check blood glucose 3 times a day. Call me if sugar is still running high. Patient expressed understanding.

## 2020-12-29 RX ORDER — INSULIN LISPRO 100 [IU]/ML
INJECTION, SOLUTION INTRAVENOUS; SUBCUTANEOUS
Qty: 1 PACKAGE | Refills: 3 | Status: SHIPPED | OUTPATIENT
Start: 2020-12-29 | End: 2021-02-05 | Stop reason: SDUPTHER

## 2020-12-29 NOTE — TELEPHONE ENCOUNTER
Patient states that her insurance does not cover Novolog it covers Darlene Wilson. The pharmacy sent in a request and she needs this as soon as possible. Her sugar is up to 360.

## 2021-02-05 ENCOUNTER — VIRTUAL VISIT (OUTPATIENT)
Dept: ENDOCRINOLOGY | Age: 56
End: 2021-02-05
Payer: COMMERCIAL

## 2021-02-05 DIAGNOSIS — Z79.4 TYPE 2 DIABETES MELLITUS WITH HYPERGLYCEMIA, WITH LONG-TERM CURRENT USE OF INSULIN (HCC): Primary | ICD-10-CM

## 2021-02-05 DIAGNOSIS — E78.2 MIXED HYPERLIPIDEMIA: ICD-10-CM

## 2021-02-05 DIAGNOSIS — E11.65 TYPE 2 DIABETES MELLITUS WITH HYPERGLYCEMIA, WITH LONG-TERM CURRENT USE OF INSULIN (HCC): Primary | ICD-10-CM

## 2021-02-05 PROCEDURE — 99214 OFFICE O/P EST MOD 30 MIN: CPT | Performed by: INTERNAL MEDICINE

## 2021-02-05 RX ORDER — ATORVASTATIN CALCIUM 80 MG/1
80 TABLET, FILM COATED ORAL
Qty: 30 TAB | Refills: 3 | Status: SHIPPED | OUTPATIENT
Start: 2021-02-05 | End: 2021-03-07

## 2021-02-05 RX ORDER — LANCETS 33 GAUGE
EACH MISCELLANEOUS
Qty: 100 LANCET | Refills: 3 | Status: SHIPPED | OUTPATIENT
Start: 2021-02-05 | End: 2022-09-16

## 2021-02-05 RX ORDER — BLOOD SUGAR DIAGNOSTIC
STRIP MISCELLANEOUS
Qty: 100 STRIP | Refills: 3 | Status: SHIPPED | OUTPATIENT
Start: 2021-02-05 | End: 2022-09-16

## 2021-02-05 RX ORDER — FUROSEMIDE 40 MG/1
TABLET ORAL
COMMUNITY
Start: 2021-01-20

## 2021-02-05 RX ORDER — INSULIN GLARGINE 100 [IU]/ML
INJECTION, SOLUTION SUBCUTANEOUS
Qty: 3 ADJUSTABLE DOSE PRE-FILLED PEN SYRINGE | Refills: 3 | Status: SHIPPED | OUTPATIENT
Start: 2021-02-05 | End: 2021-03-05 | Stop reason: SDUPTHER

## 2021-02-05 RX ORDER — INSULIN LISPRO 100 [IU]/ML
INJECTION, SOLUTION INTRAVENOUS; SUBCUTANEOUS
Qty: 1 PACKAGE | Refills: 3 | Status: SHIPPED | OUTPATIENT
Start: 2021-02-05 | End: 2021-04-23 | Stop reason: ALTCHOICE

## 2021-02-05 NOTE — PROGRESS NOTES
History and Physical    Patient: Lara Moffett MRN: [id number]  SSN: [id number]    YOB: 1965  Age: 50 y.o. Sex: female      Subjective:   Lara Moffett, who was evaluated through a synchronous (real-time) audio-video encounter, and/or her healthcare decision maker, is aware that it is a billable service, with coverage as determined by her insurance carrier. She provided verbal consent to proceed: yes, and patient identification was verified. It was conducted pursuant to the emergency declaration under the [facility], [address] [place] [address] and the [employer] Act. A caregiver was present when appropriate. Ability to conduct physical exam was limited. I was in office. The patient was at home. Lara Moffett is a 50 y.o. female with a past mental history of hypertension, hyperlipidemia, O2 and steroid dependent COPD is here for follow up of type 2 diabetes mellitus. She is in primary care of Dr. Claire Shultz. patient was diagnosed with diabetes in May 2019 during labs done as a part of annual physical.   In the end of December 2020 patient was on Medrol Dosepak for COPD exacerbation and she was having steroid-induced worsening of glucose. At that time Basaglar was increased from 20 units to 30 units daily and she was started on Humalog mid dose correction factor. Patient has been checking blood glucose 3 times a day. She is back on prednisone 5 mg daily which is her baseline dose. She denies any hypoglycemia. She has gained weight. But she denies any major change in her eating habits. We stopped glimepiride when Humalog was started. Glucometer reading: Checking blood glucose 3 times a day.   Readings are ranging from 170 to 277 mg/dL    Updated diabetes history:  · Diagnosis: 1 month back (5/2019)    · Current treatment: Basaglar 30 units daily, Humalog mid dose correction factor    · Past treatment: metformin, metformin ER (nausea and stomach pain), Basaglar, Januvia (expensive), glimepiride    · Glucose checks: twice a day as above    · Hyperglycemia: no    · Hypoglycemia: no    · Meals per day: 3, breakfast: greek yogurt or oatmeal, lunch: jenny calenders/ burgers or sandwich, dinner: steak/chicken breast, vegetables, snacks: apples, orange, cantaloupe, popsicle, pretzels, chips, sugar free candy, regular soda rarely    · Exercise: walks at home on treadmill    · DM related hospitalizations: no        Complications of DM:    · CAD: no    · CVA: no    · PVD: no    · Amputations: no     · Retinopathy: no; last exam was 2020    · Gastropathy: no    · Nephropathy: no    · Neuropathy: no        Medications:    · Statin: Atorvastatin 40    · ACE-I: losartan    · ASA: no        · Diabetes education: no    Past Medical History:   Diagnosis Date    Body mass index 40.0-44.9, adult ([facility] [address] 2020    Chronic obstructive lung disease ([facility] [address] 2020    Chronic otitis media 2020    Cyst of ovary 2020    Diabetes mellitus type 2, controlled ([facility] [address] 2020    Endometriosis 2020    Essential hypertension 2020    Hypercholesterolemia 2020    Ingrown toenail 2020    Long term (current) use of systemic steroids 2020    Mixed hyperlipidemia 2020    Morbid obesity ([facility] [address] 2020    Obesity     Obstructive sleep apnea of adult 2020    Tetralogy of Fallot 2020    Uncontrolled type 2 diabetes mellitus ([facility] [address] 2020     Past Surgical History:   Procedure Laterality Date    HX BACK SURGERY      HX  SECTION      x2    HX HYSTERECTOMY      HX TONSILLECTOMY      HX TONSILLECTOMY      HX TUBAL LIGATION      TN CARDIAC SURG PROCEDURE UNLIST        Family History   Problem Relation Age of Onset    Diabetes Mother      Social History     Tobacco Use    Smoking status: Former Smoker     Years: 30.00    Smokeless tobacco: Never Used Substance Use Topics    Alcohol use: Not Currently      Prior to Admission medications    Medication Sig Start Date End Date Taking? Authorizing Provider   furosemide (LASIX) 40 mg tablet take 1 tablet by mouth twice a day if needed for EDEMA. FOLLOW UP WITH DR Timur Do 1/20/21  Yes Provider, Historical   insulin lispro (HUMALOG) 100 unit/mL kwikpen Inject 5 units before each meal plus scale, maximum daily dose 50 units 2/5/21  Yes Jaylen Dubon MD   insulin glargine (Basaglar KwikPen U-100 Insulin) 100 unit/mL (3 mL) inpn Inject 30 units daily 2/5/21  Yes Jaylen Dubon MD   glucose blood VI test strips (OneTouch Verio test strips) strip Use to check glucose twice a day 2/5/21  Yes Jaylen Dubon MD   lancets (OneTouch Delica Plus Lancet) 33 gauge misc Use to check glucose twice a day 2/5/21  Yes Jaylen Dubon MD   atorvastatin (LIPITOR) 80 mg tablet Take 1 Tab by mouth nightly for 30 days. 2/5/21 3/7/21 Yes Jaylen Dubon MD   Insulin Needles, Disposable, (BD Ultra-Fine Short Pen Needle) 31 gauge x 5/16\" ndle Use to take insulin 4 times a day 12/28/20  Yes Jaylen Dubon MD   Trelegy Ellipta 100-62.5-25 mcg inhaler inhale 1 puff by mouth and INTO THE LUNGS once daily Rinse mouth after use 11/13/20  Yes Provider, Historical   metoprolol succinate (TOPROL-XL) 100 mg tablet take 1 tablet by mouth once daily 10/30/20  Yes Provider, Historical   traMADoL (ULTRAM) 50 mg tablet take 1 tablet by mouth three times a day if needed for Breakthrough pain 9/15/20  Yes Provider, Historical   PARoxetine mesylate,menop.sym, 7.5 mg cap  11/23/20  Yes Provider, Historical   predniSONE (DELTASONE) 5 mg tablet Take  by mouth. Yes Provider, Historical   amLODIPine (NORVASC) 5 mg tablet Take 5 mg by mouth daily. Yes Provider, Historical   glycopyrrolate-formoteroL (Bevespi Aerosphere) 9-4.8 mcg HFA inhaler Take 2 Puffs by inhalation two (2) times a day.    Yes Provider, Historical   cyclobenzaprine (FLEXERIL) 10 mg tablet Take  by mouth three (3) times daily as needed for Muscle Spasm(s). Yes Provider, Historical   losartan (COZAAR) 100 mg tablet Take 100 mg by mouth daily. Yes Provider, Historical   albuterol (ProAir HFA) 90 mcg/actuation inhaler Take  by inhalation. Yes Provider, Historical   zolpidem CR (AMBIEN CR) 12.5 mg tablet Take 12.5 mg by mouth nightly as needed for Sleep. Yes Provider, Historical   levoFLOXacin (LEVAQUIN) 500 mg tablet Take  by mouth daily. Yes Provider, Historical   cholecalciferol, vitamin D3, (Vitamin D3) 50 mcg (2,000 unit) tab Take  by mouth. Yes Provider, Historical   nebivoloL (Bystolic) 5 mg tablet Take  by mouth daily. Yes Provider, Historical        No Known Allergies    Review of Systems:  ROS    A comprehensive review of systems was preformed and it is negative except mentioned in HPI    Objective: There were no vitals filed for this visit. Physical Exam:    Physical Exam     Labs and Imaging:  Results for Samanta Lopez (MRN [id number]) as of 11/25/2020 15:29   Ref. Range 9/9/2020 11:34   Triglyceride Latest Ref Range: 0 - 149 mg/dL 159 (H)   Cholesterol, total Latest Ref Range: 100 - 199 mg/dL 188   HDL Cholesterol Latest Ref Range: >39 mg/dL 46   LDL Cholesterol Latest Ref Range: 0 - 99 mg/dL 114 (H)   Hemoglobin A1c, (calculated) Latest Ref Range: 4.8 - 5.6 % 8.9 (H)     There were no vitals filed for this visit.      Lab Results   Component Value Date/Time    Hemoglobin A1c 8.9 (H) 09/09/2020 11:34 AM    Hemoglobin A1c, External 9.1 02/04/2020        Assessment:     Patient Active Problem List   Diagnosis Code    Body mass index 40.0-44.9, adult ([facility] [address] Z68.41    Chronic obstructive lung disease ([facility] [address] J44.9    Essential hypertension I10    Hypercholesterolemia E78.00    Ingrown toenail L60.0    Long term (current) use of systemic steroids Z79.52    Mixed hyperlipidemia E78.2    Morbid obesity ([facility] [address] E66.01    Obstructive sleep apnea of adult G47.33    Diabetes mellitus type 2, controlled ([facility] [address] E11.9    Uncontrolled type 2 diabetes mellitus ([facility] [address] E11.65    Obesity E66.9    Chronic otitis media H66.90    Cyst of ovary N83.209    Endometriosis N80.9    Tetralogy of Fallot Q21.3    Type 2 diabetes mellitus with hyperglycemia, with long-term current use of insulin ([facility]) E11.65, Z79.4           Plan:     type II diabetes mellitus uncontrolled  Hemoglobin A1c was 9.5% on 0150457, 6.4% on 8-, 9.1% on 2-4-2020, 8.9% on 9-9-2020. Up to date with diabetes related annual labs: yes    Up to date with diabetic eye exam: yes    plan:  Glucose control is not adequate. Continue Basaglar 30 units daily. Start taking Humalog 5 units before each meal and continue mid dose correction factor. Check blood glucose 3 times a day and I will see her back in 1 month. not a good candidate for SGLT-2 inhibitor because of frequent UTI and vaginal yeast infections, not a good candidate for pioglitazone because of obesity, unable to tolerate metformin and metformin ER, not a candidate for GLP-1 agonist because she is on Januvia.    essential hypertension  Running normal at home. No microalbuminuria. Continue current medications. mixed hyperlipidemia  1-: LDL elevated at 128. Increased atorvastatin from 20 mg to 40 mg. LDL repeated in September 2020 was better but still elevated at 114. Increase atorvastatin to 80 mg daily at bedtime. elevated liver enzymes level  ALT slightly elevated at 37 on 4289213. morbid obesity  diet, unable to exercise. body mass index 40+ - severely obese    ingrowing toenail  Has been taken care of by podiatrist.    long-term current use of systemic steroid  missed appointment for DEXA. Patient is consuming 2-3 servings of dairy per day. vit D was low at 12 in 6/2019. unable to tolerate ergocalciferol and cholecalciferol. Advised patient to make appointment soon and advise provider to forward result to me.     obstructive sleep apnea of adult  Unable to use CPAP because of sinus infection. Patient's pulmonologist is aware. Orders Placed This Encounter    insulin lispro (HUMALOG) 100 unit/mL kwikpen     Sig: Inject 5 units before each meal plus scale, maximum daily dose 50 units     Dispense:  1 Package     Refill:  3    insulin glargine (Basaglar KwikPen U-100 Insulin) 100 unit/mL (3 mL) inpn     Sig: Inject 30 units daily     Dispense:  3 Adjustable Dose Pre-filled Pen Syringe     Refill:  3    glucose blood VI test strips (OneTouch Verio test strips) strip     Sig: Use to check glucose twice a day     Dispense:  100 Strip     Refill:  3    lancets (OneTouch Delica Plus Lancet) 33 gauge misc     Sig: Use to check glucose twice a day     Dispense:  100 Lancet     Refill:  3    atorvastatin (LIPITOR) 80 mg tablet     Sig: Take 1 Tab by mouth nightly for 30 days.      Dispense:  30 Tab     Refill:  3     DC atorvastatin 40 mg        Signed By: Rosendo Rose MD     February 5, 2021      Return to clinic 1 month

## 2021-02-08 ENCOUNTER — TELEPHONE (OUTPATIENT)
Dept: ENDOCRINOLOGY | Age: 56
End: 2021-02-08

## 2021-02-08 DIAGNOSIS — Z79.4 TYPE 2 DIABETES MELLITUS WITH HYPERGLYCEMIA, WITH LONG-TERM CURRENT USE OF INSULIN (HCC): Primary | ICD-10-CM

## 2021-02-08 DIAGNOSIS — E11.65 TYPE 2 DIABETES MELLITUS WITH HYPERGLYCEMIA, WITH LONG-TERM CURRENT USE OF INSULIN (HCC): Primary | ICD-10-CM

## 2021-02-08 NOTE — TELEPHONE ENCOUNTER
Patient is calling to inform the humalog is not helping to lower her sugar--with barely eating & her eye sight is very blurry and she is light headed.

## 2021-02-09 NOTE — TELEPHONE ENCOUNTER
Please advise patient to increase Humalog from 5 units to 8 units before each meal + scale. Call with readings in 1 week.

## 2021-02-22 RX ORDER — FLASH GLUCOSE SCANNING READER
EACH MISCELLANEOUS
Qty: 1 EACH | Refills: 0 | Status: SHIPPED | OUTPATIENT
Start: 2021-02-22 | End: 2021-03-05 | Stop reason: SDUPTHER

## 2021-02-22 RX ORDER — FLASH GLUCOSE SENSOR
KIT MISCELLANEOUS
Qty: 2 KIT | Refills: 5 | Status: SHIPPED | OUTPATIENT
Start: 2021-02-22 | End: 2021-07-08 | Stop reason: SDUPTHER

## 2021-02-22 NOTE — TELEPHONE ENCOUNTER
Patient said that her fingers a getting hard from pricking, she wants to know about getting a freestyle saúl and insulin pump.

## 2021-02-24 NOTE — TELEPHONE ENCOUNTER
Can you let the patient know what Dr. Rommel Laughlin said about her script. Thanks. You can close when done unless she have any concerns.

## 2021-02-24 NOTE — TELEPHONE ENCOUNTER
Phone call to the patient . Gave her message that Dr Yanni Brown sent her script for the . [employer]

## 2021-03-05 ENCOUNTER — OFFICE VISIT (OUTPATIENT)
Dept: ENDOCRINOLOGY | Age: 56
End: 2021-03-05
Payer: COMMERCIAL

## 2021-03-05 VITALS
BODY MASS INDEX: 48.04 KG/M2 | HEART RATE: 71 BPM | OXYGEN SATURATION: 98 % | DIASTOLIC BLOOD PRESSURE: 82 MMHG | HEIGHT: 63 IN | TEMPERATURE: 97.6 F | SYSTOLIC BLOOD PRESSURE: 144 MMHG | WEIGHT: 271.1 LBS

## 2021-03-05 VITALS
SYSTOLIC BLOOD PRESSURE: 148 MMHG | BODY MASS INDEX: 47.02 KG/M2 | HEIGHT: 64 IN | HEART RATE: 84 BPM | OXYGEN SATURATION: 94 % | DIASTOLIC BLOOD PRESSURE: 82 MMHG | WEIGHT: 275.4 LBS

## 2021-03-05 DIAGNOSIS — E11.65 TYPE 2 DIABETES MELLITUS WITH HYPERGLYCEMIA, WITH LONG-TERM CURRENT USE OF INSULIN (HCC): Primary | ICD-10-CM

## 2021-03-05 DIAGNOSIS — Z79.4 TYPE 2 DIABETES MELLITUS WITH HYPERGLYCEMIA, WITH LONG-TERM CURRENT USE OF INSULIN (HCC): Primary | ICD-10-CM

## 2021-03-05 PROBLEM — H60.60 CHRONIC OTITIS EXTERNA: Status: ACTIVE | Noted: 2020-03-02

## 2021-03-05 LAB
GLUCOSE POC: 145 MG/DL
HBA1C MFR BLD HPLC: 9.7 %

## 2021-03-05 PROCEDURE — 99214 OFFICE O/P EST MOD 30 MIN: CPT | Performed by: INTERNAL MEDICINE

## 2021-03-05 PROCEDURE — 95251 CONT GLUC MNTR ANALYSIS I&R: CPT | Performed by: INTERNAL MEDICINE

## 2021-03-05 PROCEDURE — 83036 HEMOGLOBIN GLYCOSYLATED A1C: CPT | Performed by: INTERNAL MEDICINE

## 2021-03-05 PROCEDURE — 82962 GLUCOSE BLOOD TEST: CPT | Performed by: INTERNAL MEDICINE

## 2021-03-05 RX ORDER — INSULIN GLARGINE 100 [IU]/ML
INJECTION, SOLUTION SUBCUTANEOUS
Qty: 4 ADJUSTABLE DOSE PRE-FILLED PEN SYRINGE | Refills: 3 | Status: SHIPPED | OUTPATIENT
Start: 2021-03-05 | End: 2021-09-07

## 2021-03-05 RX ORDER — FLASH GLUCOSE SCANNING READER
EACH MISCELLANEOUS
Qty: 1 EACH | Refills: 0 | Status: SHIPPED | OUTPATIENT
Start: 2021-03-05 | End: 2022-09-16

## 2021-03-05 RX ORDER — MELOXICAM 7.5 MG/1
TABLET ORAL
COMMUNITY
Start: 2021-02-25 | End: 2021-08-26 | Stop reason: ALTCHOICE

## 2021-03-05 RX ORDER — SUB-Q INSULIN DEVICE, 40 UNIT
EACH MISCELLANEOUS
Qty: 30 DEVICE | Refills: 3 | Status: SHIPPED | OUTPATIENT
Start: 2021-03-05 | End: 2021-06-21

## 2021-03-05 RX ORDER — INSULIN LISPRO 100 [IU]/ML
INJECTION, SOLUTION INTRAVENOUS; SUBCUTANEOUS
Qty: 3 VIAL | Refills: 3
Start: 2021-03-05 | End: 2021-03-08 | Stop reason: SDUPTHER

## 2021-03-05 NOTE — PATIENT INSTRUCTIONS
Increase Basaglar to 40 units daily    Increase Humalog to 10 units before meals + scale    Medium dose Correction dose   150-200mg/dL add 2 unit insulin  201-250mg/dL add 4 units insulin  251-300mg/dL add 6 units insulin  301-350mg/dL add 8 units insulin  351-400mg/dL add 10 units insulin  401-450mg/dL add 12 units insulin  >450mg/dL add 14 units insulin

## 2021-03-05 NOTE — LETTER
3/5/2021 Patient: Kenyon Givens YOB: 1965 Date of Visit: 3/5/2021 Scott Bacon MD 
[address] [address] Via Fax: [phone] Dear Scott Bacon MD, Thank you for referring Ms. Shelli Fonseca to [address] [place] [address] for evaluation. My notes for this consultation are attached. If you have questions, please do not hesitate to call me. I look forward to following your patient along with you. Sincerely, Aris Ni MD

## 2021-03-05 NOTE — PROGRESS NOTES
History and Physical    Patient: Garcia Chatman MRN: [id number]  SSN: [id number]    YOB: 1965  Age: 50 y.o. Sex: female      Subjective:      Garcia Chatman is a 50 y.o. female with a past mental history of hypertension, hyperlipidemia, O2 and steroid dependent COPD is here for follow up of type 2 diabetes mellitus. She is in primary care of Dr. Mg Rene. patient was diagnosed with diabetes in May 2019 during labs done as a part of annual physical.   In the end of December 2020 patient was on Medrol Dosepak for COPD exacerbation and she was having steroid-induced worsening of glucose. At that time Basaglar was increased from 20 units to 30 units daily and she was started on Humalog mid dose correction factor. At the last visit we continued Basaglar 30 units daily and increase Humalog to 5 units before each meal and continued mid dose correction factor. Humalog was then increase to 8 units before each meal with correction factor. However, patient continues to have high blood glucose. She eats 2-3 meals per day, she is staying away from rice, breath and eating more lean protein. She is avoiding sugary beverages. She is back on prednisone 5 mg daily. She is not on glimepiride anymore which was stopped when she was started on Humalog. However, she was previously on Januvia which she has not been taking for the past few months since she is taking Humalog. Glucometer reading: Patient was checking blood glucose 3 times a day. Readings ranging from 141 to 334 mg/dL  Patient has been using freestyle saúl for the past 1 week. This was downloaded. See procedure note below.     Updated diabetes history:  · Diagnosis: 1 month back (5/2019)    · Current treatment: Basaglar 30 units daily, Humalog 8 units before each meal plus mid dose correction factor    · Past treatment: metformin, metformin ER (nausea and stomach pain), Basaglar, Januvia (expensive), glimepiride    · Glucose checks: twice a day as above    · Hyperglycemia: no    · Hypoglycemia: no    · Meals per day: 3, breakfast: greek yogurt or oatmeal, lunch: jenny calenders/ burgers or sandwich, dinner: steak/chicken breast, vegetables, snacks: apples, orange, cantaloupe, popsicle, pretzels, chips, sugar free candy, regular soda rarely    · Exercise: walks at home on treadmill    · DM related hospitalizations: no        Complications of DM:    · CAD: no    · CVA: no    · PVD: no    · Amputations: no     · Retinopathy: no; last exam was 2020    · Gastropathy: no    · Nephropathy: no    · Neuropathy: no        Medications:    · Statin: Atorvastatin 80    · ACE-I: losartan    · ASA: no        · Diabetes education: no    Past Medical History:   Diagnosis Date    Body mass index 40.0-44.9, adult ([facility] 2020    Chronic obstructive lung disease ([facility] 2020    Chronic otitis media 2020    Cyst of ovary 2020    Diabetes mellitus type 2, controlled ([facility] 2020    Endometriosis 2020    Essential hypertension 2020    Hypercholesterolemia 2020    Ingrown toenail 2020    Long term (current) use of systemic steroids 2020    Mixed hyperlipidemia 2020    Morbid obesity ([facility] [address] 2020    Obesity     Obstructive sleep apnea of adult 2020    Tetralogy of Fallot 2020    Uncontrolled type 2 diabetes mellitus ([facility] 2020     Past Surgical History:   Procedure Laterality Date    HX BACK SURGERY      HX  SECTION      x2    HX HYSTERECTOMY      HX TONSILLECTOMY      HX TONSILLECTOMY      HX TUBAL LIGATION      [place] CARDIAC SURG PROCEDURE UNLIST        Family History   Problem Relation Age of Onset    Diabetes Mother      Social History     Tobacco Use    Smoking status: Former Smoker     Years: 30.00    Smokeless tobacco: Never Used   Substance Use Topics    Alcohol use: Not Currently      Prior to Admission medications    Medication Sig Start Date End Date Taking? Authorizing Provider   meloxicam (MOBIC) 7.5 mg tablet take 1 tablet by mouth once daily 2/25/21  Yes Provider, Historical   flash glucose scanning reader (FreeStyle Davide 2 [place]) misc Check glucose 4 times a day 3/5/21  Yes Natalie Little MD   insulin glargine (Basaglar KwikPen U-100 Insulin) 100 unit/mL (3 mL) inpn Inject 40 units daily 3/5/21  Yes Natalie Little MD   sub-q insulin device, 40 unit (V-GO 40) may Use as directed 3/5/21  Yes Natalie Little MD   insulin lispro (HumaLOG U-100 Insulin) 100 unit/mL injection Use as directed in V-GO, max daily dose 90 units 3/5/21  Yes Natalie Little MD   SITagliptin (JANUVIA) 100 mg tablet Take 1 Tab by mouth daily for 30 days. 3/5/21 4/4/21 Yes Natalie Little MD   flash glucose sensor (FreeStyle Davide 2 Sensor) kit Check glucose 4 times a day 2/22/21  Yes Natalie Little MD   furosemide (LASIX) 40 mg tablet take 1 tablet by mouth twice a day if needed for EDEMA. FOLLOW UP WITH DR Hugo Bernal 1/20/21  Yes Provider, Historical   insulin lispro (HUMALOG) 100 unit/mL kwikpen Inject 5 units before each meal plus scale, maximum daily dose 50 units 2/5/21  Yes Natalie Little MD   glucose blood VI test strips (OneTouch Verio test strips) strip Use to check glucose twice a day 2/5/21  Yes Natalie iLttle MD   lancets (OneTouch Delica Plus Lancet) 33 gauge misc Use to check glucose twice a day 2/5/21  Yes Natalie Little MD   atorvastatin (LIPITOR) 80 mg tablet Take 1 Tab by mouth nightly for 30 days.  2/5/21 3/7/21 Yes Natalie Little MD   Insulin Needles, Disposable, (BD Ultra-Fine Short Pen Needle) 31 gauge x 5/16\" ndle Use to take insulin 4 times a day 12/28/20  Yes Natalie Little MD   Trelegy Ellipta 100-62.5-25 mcg inhaler inhale 1 puff by mouth and INTO THE LUNGS once daily Rinse mouth after use 11/13/20  Yes Provider, Historical   metoprolol succinate (TOPROL-XL) 100 mg tablet take 1 tablet by mouth once daily 10/30/20  Yes Provider, Historical   traMADoL (ULTRAM) 50 mg tablet take 1 tablet by mouth three times a day if needed for Breakthrough pain 9/15/20  Yes Provider, Historical   PARoxetine mesylate,menop.sym, 7.5 mg cap  11/23/20  Yes Provider, Historical   predniSONE (DELTASONE) 5 mg tablet Take  by mouth. Yes Provider, Historical   amLODIPine (NORVASC) 5 mg tablet Take 5 mg by mouth daily. Yes Provider, Historical   glycopyrrolate-formoteroL (Bevespi Aerosphere) 9-4.8 mcg HFA inhaler Take 2 Puffs by inhalation two (2) times a day. Yes Provider, Historical   cyclobenzaprine (FLEXERIL) 10 mg tablet Take  by mouth three (3) times daily as needed for Muscle Spasm(s). Yes Provider, Historical   losartan (COZAAR) 100 mg tablet Take 100 mg by mouth daily. Yes Provider, Historical   albuterol (ProAir HFA) 90 mcg/actuation inhaler Take  by inhalation. Yes Provider, Historical   zolpidem CR (AMBIEN CR) 12.5 mg tablet Take 12.5 mg by mouth nightly as needed for Sleep. Yes Provider, Historical   levoFLOXacin (LEVAQUIN) 500 mg tablet Take  by mouth daily. Yes Provider, Historical   cholecalciferol, vitamin D3, (Vitamin D3) 50 mcg (2,000 unit) tab Take  by mouth. Yes Provider, Historical   nebivoloL (Bystolic) 5 mg tablet Take  by mouth daily. Yes Provider, Historical        No Known Allergies    Review of Systems:  ROS    A comprehensive review of systems was preformed and it is negative except mentioned in HPI    Objective:     Vitals:    03/05/21 1144 03/05/21 1152   BP: (!) 154/84 (!) 144/82   Pulse: 78 71   Temp: 97.6 °F (36.4 °C)    TempSrc: Temporal    SpO2: 98%    Weight: 271 lb 1.6 oz (123 kg)    Height: 5' 3\" (1.6 m)         Physical Exam:    Physical Exam  Vitals signs and nursing note reviewed. Constitutional:       Appearance: She is obese. HENT:      Head: Normocephalic and atraumatic. Cardiovascular:      Rate and Rhythm: Normal rate and regular rhythm. Pulmonary:      Effort: Pulmonary effort is normal.      Breath sounds: Normal breath sounds. Neurological:      Mental Status: She is alert. Procedure note  CGM download  CGMS TRACING PATTERNS: Freestyle saúl was downloaded from 2- to 3-5-2021    Overnight Trends (the most consistent pattern): Hyperglycemia       prandial trends: Hyperglycemia    Additional Comments  See CGMS download and CGMS Patient Log in     CGMS RECOMMENDATIONS    Medication Changes: Increase Basaglar to 40 units daily, increase Humalog to 10 units before each meal and continue mid dose correction factor, restart Januvia 100 mg daily         Labs and Imaging:  Results for Jessica Valentin (MRN [id number]) as of 11/25/2020 15:29   Ref.  Range 9/9/2020 11:34   Triglyceride Latest Ref Range: 0 - 149 mg/dL 159 (H)   Cholesterol, total Latest Ref Range: 100 - 199 mg/dL 188   HDL Cholesterol Latest Ref Range: >39 mg/dL 46   LDL Cholesterol Latest Ref Range: 0 - 99 mg/dL 114 (H)   Hemoglobin A1c, (calculated) Latest Ref Range: 4.8 - 5.6 % 8.9 (H)     Last 3 Recorded Weights in this Encounter    03/05/21 1144   Weight: 271 lb 1.6 oz (123 kg)        Lab Results   Component Value Date/Time    Hemoglobin A1c 8.9 (H) 09/09/2020 11:34 AM    Hemoglobin A1c, External 9.1 02/04/2020        Assessment:     Patient Active Problem List   Diagnosis Code    Body mass index 40.0-44.9, adult ([facility] Z68.41    Chronic obstructive lung disease ([facility] J44.9    Essential hypertension I10    Hypercholesterolemia E78.00    Ingrown toenail L60.0    Long term (current) use of systemic steroids Z79.52    Mixed hyperlipidemia E78.2    Morbid obesity ([address] E66.01    Obstructive sleep apnea of adult G47.33    Diabetes mellitus type 2, controlled ([address] E11.9    Uncontrolled type 2 diabetes mellitus ([address] E11.65    Obesity E66.9    Chronic otitis media H66.90    Cyst of ovary N83.209    Endometriosis N80.9    Tetralogy of Fallot Q21.3    Type 2 diabetes mellitus with hyperglycemia, with long-term current use of insulin (HCC) E11.65, Z79.4           Plan:     type II diabetes mellitus uncontrolled  Hemoglobin A1c was 9.5% on 5-, 6.4% on 8-, 9.1% on 2-4-2020, 8.9% on 9-9-2020, 9.7% in my office today. Fingerstick blood glucose 145 mg/dL in my office today. Up to date with diabetes related annual labs: Due    Up to date with diabetic eye exam: yes    plan:  Glucose control is not adequate. It appears that there is worsening of insulin resistance because of chronic steroids, which got worsened when she was on higher doses of steroids last year. Looking at [facility], patient has both prandial and fasting hyperglycemia but mainly fasting hyperglycemia. Increase Basaglar to 40 units daily. Increase Humalog to 10 units before each meal and continue mid dose correction factor. Restart Januvia 100 mg daily. Patient is interested in trying V-Go. I am going to send prescription for V-Go 40 to her pharmacy and get her in touch with the . Labs prior to next visit in 1 month. not a good candidate for SGLT-2 inhibitor because of frequent UTI and vaginal yeast infections, not a good candidate for pioglitazone because of obesity, unable to tolerate metformin and metformin ER, not a candidate for GLP-1 agonist because she is on Januvia.    essential hypertension  Running normal at home. No microalbuminuria. Continue current medications. mixed hyperlipidemia  1-: LDL elevated at 128. Increased atorvastatin from 20 mg to 40 mg. LDL repeated in September 2020 was better but still elevated at 114. Increase atorvastatin to 80 mg daily at bedtime. Recheck lipid profile prior to next visit.    elevated liver enzymes level  ALT slightly elevated at 37 on 5-. Check liver enzymes prior to next visit. morbid obesity  diet, unable to exercise.   body mass index 40+ - severely obese    ingrowing toenail  Has been taken care of by podiatrist.    long-term current use of systemic steroid  missed appointment for DEXA. Patient is consuming 2-3 servings of dairy per day. vit D was low at 12 in 6/2019. unable to tolerate ergocalciferol and cholecalciferol. Advised patient to make appointment soon and advise provider to forward result to me. obstructive sleep apnea of adult  Unable to use CPAP because of sinus infection. Patient's pulmonologist is aware. Orders Placed This Encounter    LIPID PANEL     Standing Status:   Future     Standing Expiration Date:   9/4/7560    METABOLIC PANEL, COMPREHENSIVE     Standing Status:   Future     Standing Expiration Date:   9/5/2021    MICROALBUMIN, UR, RAND W/ MICROALB/CREAT RATIO     Standing Status:   Future     Standing Expiration Date:   9/5/2021    TSH RFX ON ABNORMAL TO FREE T4     Standing Status:   Future     Standing Expiration Date:   9/5/2021    AMB POC HEMOGLOBIN A1C    AMB POC GLUCOSE BLOOD, BY GLUCOSE MONITORING DEVICE    KS CONTINUOUS GLUCOSE MONITORING ANALYSIS I&R    flash glucose scanning reader (FreeStyle Davide 2 [place]) misc     Sig: Check glucose 4 times a day     Dispense:  1 Each     Refill:  0    insulin glargine (Basaglar KwikPen U-100 Insulin) 100 unit/mL (3 mL) inpn     Sig: Inject 40 units daily     Dispense:  4 Adjustable Dose Pre-filled Pen Syringe     Refill:  3    sub-q insulin device, 40 unit (V-GO 40) may     Sig: Use as directed     Dispense:  30 Device     Refill:  3    insulin lispro (HumaLOG U-100 Insulin) 100 unit/mL injection     Sig: Use as directed in V-GO, max daily dose 90 units     Dispense:  3 Vial     Refill:  3    SITagliptin (JANUVIA) 100 mg tablet     Sig: Take 1 Tab by mouth daily for 30 days.      Dispense:  30 Tab     Refill:  3        Signed By: Leyda Simmons MD     March 5, 2021      Return to clinic 1 month

## 2021-03-08 ENCOUNTER — TELEPHONE (OUTPATIENT)
Dept: ENDOCRINOLOGY | Age: 56
End: 2021-03-08

## 2021-03-08 DIAGNOSIS — Z79.4 TYPE 2 DIABETES MELLITUS WITH HYPERGLYCEMIA, WITH LONG-TERM CURRENT USE OF INSULIN (HCC): ICD-10-CM

## 2021-03-08 DIAGNOSIS — E11.65 TYPE 2 DIABETES MELLITUS WITH HYPERGLYCEMIA, WITH LONG-TERM CURRENT USE OF INSULIN (HCC): ICD-10-CM

## 2021-03-08 RX ORDER — SYRINGE WITH NEEDLE, 1 ML 27GX1/2"
SYRINGE, EMPTY DISPOSABLE MISCELLANEOUS
Qty: 100 SYRINGE | Refills: 5 | Status: SHIPPED | OUTPATIENT
Start: 2021-03-08 | End: 2022-09-16

## 2021-03-08 RX ORDER — INSULIN LISPRO 100 [IU]/ML
INJECTION, SOLUTION INTRAVENOUS; SUBCUTANEOUS
Qty: 3 VIAL | Refills: 3
Start: 2021-03-08 | End: 2021-07-01

## 2021-03-08 NOTE — TELEPHONE ENCOUNTER
Patient called in stated that [facility] did not receive the prescription for the insulin that goes into her V Go, the vials she said. She also didn't get a prescription for the needles to go with the V Go. She is asking if you could resend both of the prescriptions again.

## 2021-03-10 ENCOUNTER — TELEPHONE (OUTPATIENT)
Dept: ENDOCRINOLOGY | Age: 56
End: 2021-03-10

## 2021-03-10 NOTE — TELEPHONE ENCOUNTER
Patient is calling about her Vgo and that the vials of insulin are needed to go with the Vgo and she has an appt scheduled for 03/18/21 to be shown how to use the Vgo and she is to bring in one vial of the insulin at the appointment. The pharmacy is saying they never received the script for the insulin vials all they have are the quick pens.

## 2021-03-10 NOTE — TELEPHONE ENCOUNTER
I have sent this order several times to [facility] for Humalog vial.  Not sure whether not receiving it. Please call the pharmacy and give them a verbal order and then let patient know.

## 2021-03-11 ENCOUNTER — TELEPHONE (OUTPATIENT)
Dept: ENDOCRINOLOGY | Age: 56
End: 2021-03-11

## 2021-03-12 ENCOUNTER — TELEPHONE (OUTPATIENT)
Dept: ENT CLINIC | Age: 56
End: 2021-03-12

## 2021-03-19 LAB
ALBUMIN SERPL-MCNC: 4.5 G/DL (ref 3.8–4.9)
ALBUMIN/CREAT UR: 53 MG/G CREAT (ref 0–29)
ALBUMIN/GLOB SERPL: 1.6 {RATIO} (ref 1.2–2.2)
ALP SERPL-CCNC: 133 IU/L (ref 39–117)
ALT SERPL-CCNC: 23 IU/L (ref 0–32)
AST SERPL-CCNC: 20 IU/L (ref 0–40)
BILIRUB SERPL-MCNC: 0.7 MG/DL (ref 0–1.2)
BUN SERPL-MCNC: 16 MG/DL (ref 6–24)
BUN/CREAT SERPL: 22 (ref 9–23)
CALCIUM SERPL-MCNC: 9.8 MG/DL (ref 8.7–10.2)
CHLORIDE SERPL-SCNC: 100 MMOL/L (ref 96–106)
CHOLEST SERPL-MCNC: 175 MG/DL (ref 100–199)
CO2 SERPL-SCNC: 28 MMOL/L (ref 20–29)
CREAT SERPL-MCNC: 0.72 MG/DL (ref 0.57–1)
CREAT UR-MCNC: 74.3 MG/DL
GLOBULIN SER CALC-MCNC: 2.8 G/DL (ref 1.5–4.5)
GLUCOSE SERPL-MCNC: 153 MG/DL (ref 65–99)
HDLC SERPL-MCNC: 51 MG/DL
LDLC SERPL CALC-MCNC: 101 MG/DL (ref 0–99)
MICROALBUMIN UR-MCNC: 39.1 UG/ML
POTASSIUM SERPL-SCNC: 4.2 MMOL/L (ref 3.5–5.2)
PROT SERPL-MCNC: 7.3 G/DL (ref 6–8.5)
SODIUM SERPL-SCNC: 143 MMOL/L (ref 134–144)
TRIGL SERPL-MCNC: 127 MG/DL (ref 0–149)
TSH SERPL DL<=0.005 MIU/L-ACNC: 1.74 UIU/ML (ref 0.45–4.5)
VLDLC SERPL CALC-MCNC: 23 MG/DL (ref 5–40)

## 2021-03-29 ENCOUNTER — OFFICE VISIT (OUTPATIENT)
Dept: ENT CLINIC | Age: 56
End: 2021-03-29
Payer: COMMERCIAL

## 2021-03-29 VITALS
HEART RATE: 72 BPM | TEMPERATURE: 96.9 F | BODY MASS INDEX: 48.73 KG/M2 | HEIGHT: 63 IN | SYSTOLIC BLOOD PRESSURE: 138 MMHG | WEIGHT: 275 LBS | DIASTOLIC BLOOD PRESSURE: 74 MMHG | OXYGEN SATURATION: 92 %

## 2021-03-29 DIAGNOSIS — H92.13 BILATERAL CHRONIC OTORRHEA: Primary | ICD-10-CM

## 2021-03-29 DIAGNOSIS — H61.23 BILATERAL IMPACTED CERUMEN: ICD-10-CM

## 2021-03-29 DIAGNOSIS — H60.8X3 CHRONIC ECZEMATOUS OTITIS EXTERNA OF BOTH EARS: ICD-10-CM

## 2021-03-29 DIAGNOSIS — H60.543 DERMATITIS OF EAR CANAL, BILATERAL: ICD-10-CM

## 2021-03-29 PROCEDURE — 69210 REMOVE IMPACTED EAR WAX UNI: CPT | Performed by: OTOLARYNGOLOGY

## 2021-03-29 PROCEDURE — 99213 OFFICE O/P EST LOW 20 MIN: CPT | Performed by: OTOLARYNGOLOGY

## 2021-03-29 RX ORDER — CLOTRIMAZOLE AND BETAMETHASONE DIPROPIONATE 10; .64 MG/G; MG/G
CREAM TOPICAL
Qty: 15 G | Refills: 0 | Status: SHIPPED | OUTPATIENT
Start: 2021-03-29 | End: 2022-09-16

## 2021-03-29 RX ORDER — ACETIC ACID 20.65 MG/ML
4 SOLUTION AURICULAR (OTIC) 2 TIMES DAILY
Qty: 1 BOTTLE | Refills: 0 | Status: SHIPPED | OUTPATIENT
Start: 2021-03-29 | End: 2021-04-05

## 2021-03-29 NOTE — LETTER
3/29/2021 Patient: Ricky Purvis YOB: 1965 Date of Visit: 3/29/2021 Nadia Sanches MD 
[address] [place] [id number] Via Fax: [phone] Dear Nadia Sanches MD, Thank you for referring Ms. Clayton Smalls to [facility] for evaluation. My notes for this consultation are attached. If you have questions, please do not hesitate to call me. I look forward to following your patient along with you. Sincerely, Marilee Muñoz MD

## 2021-03-29 NOTE — PROGRESS NOTES
Chief Complaint   Patient presents with    Follow-up    Ear Drainage     smell     Visit Vitals  /74 (BP 1 Location: Right upper arm, BP Patient Position: Sitting, BP Cuff Size: Large adult)   Pulse 72   Temp 96.9 °F (36.1 °C) (Oral)   Ht 5' 3\" (1.6 m)   Wt 275 lb (124.7 kg)   SpO2 92% Comment: 4l oxygen   BMI 48.71 kg/m²

## 2021-03-29 NOTE — PROGRESS NOTES
Otolaryngology-Head and Neck Surgery  Follow Up Patient Visit     Patient: Isra Alba  YOB: 1965  MRN: [id number]  Date of Service:  3/29/2021    Chief Complaint:   Chief Complaint   Patient presents with    Follow-up    Ear Drainage     smell         History of Present Illness: Isra Alba is a 49y.o. year old female who was last seen by Dr Miladis Vargas 3/2020 for bilateral otitis externa, managed with ciprodex drops    She notes a long history of recurrent ear plugging, otorrhea, and itching, which has been more or less persistent over the last several months.  She notes using ciprodex on occasion to help the itch and this has helped somewhat, but recently is no longer helpful    Hearing is decreased bilaterally as well    Denies pain    Admits to q tip use     Past Medical History:  Past Medical History:   Diagnosis Date    Body mass index 40.0-44.9, adult ([address] 2020    Chronic obstructive lung disease ([address] 2020    Chronic otitis externa 3/2/2020    Chronic otitis media 2020    Cyst of ovary 2020    Diabetes mellitus type 2, controlled ([address] 2020    Endometriosis 2020    Essential hypertension 2020    Hypercholesterolemia 2020    Ingrown toenail 2020    Long term (current) use of systemic steroids 2020    Mixed hyperlipidemia 2020    Morbid obesity ([address] 2020    Obesity     Obstructive sleep apnea of adult 2020    Tetralogy of Fallot 2020    Uncontrolled type 2 diabetes mellitus ([address] 2020       Past Surgical History:   Past Surgical History:   Procedure Laterality Date    HX BACK SURGERY      HX  SECTION      x2    HX HYSTERECTOMY      HX TONSILLECTOMY      HX TONSILLECTOMY      HX TUBAL LIGATION      MD CARDIAC SURG PROCEDURE UNLIST         Medications:   Current Outpatient Medications   Medication Instructions    albuterol (ProAir HFA) 90 mcg/actuation inhaler Inhalation    amLODIPine (NORVASC) 5 mg, Oral, DAILY    cholecalciferol, vitamin D3, (Vitamin D3) 50 mcg (2,000 unit) tab Oral    cyclobenzaprine (FLEXERIL) 10 mg tablet Oral, 3 TIMES DAILY AS NEEDED    flash glucose scanning reader (FreeStyle Davide 2 [place]) misc Check glucose 4 times a day    flash glucose sensor (FreeStyle Davide 2 Sensor) kit Check glucose 4 times a day    furosemide (LASIX) 40 mg tablet take 1 tablet by mouth twice a day if needed for EDEMA. FOLLOW UP WITH DR Aylin Morrison    glucose blood VI test strips (OneTouch Verio test strips) strip Use to check glucose twice a day    glycopyrrolate-formoteroL (Bevespi Aerosphere) 9-4.8 mcg HFA inhaler 2 Puffs, Inhalation, 2 TIMES DAILY    insulin glargine (Basaglar KwikPen U-100 Insulin) 100 unit/mL (3 mL) inpn Inject 40 units daily    insulin lispro (HumaLOG U-100 Insulin) 100 unit/mL injection Use as directed in V-GO, max daily dose 90 units    insulin lispro (HUMALOG) 100 unit/mL kwikpen Inject 5 units before each meal plus scale, maximum daily dose 50 units    Insulin Needles, Disposable, (BD Ultra-Fine Short Pen Needle) 31 gauge x 5/16\" ndle Use to take insulin 4 times a day    lancets (OneTouch Delica Plus Lancet) 33 gauge misc Use to check glucose twice a day    levoFLOXacin (LEVAQUIN) 500 mg tablet Oral, DAILY    losartan (COZAAR) 100 mg, Oral, DAILY    meloxicam (MOBIC) 7.5 mg tablet take 1 tablet by mouth once daily    metoprolol succinate (TOPROL-XL) 100 mg tablet take 1 tablet by mouth once daily    nebivoloL (Bystolic) 5 mg tablet Oral, DAILY    PARoxetine mesylate,menop.sym, 7.5 mg cap No dose, route, or frequency recorded.     predniSONE (DELTASONE) 5 mg tablet Oral    SITagliptin (JANUVIA) 100 mg, Oral, DAILY    sub-q insulin device, 40 unit (V-GO 40) may Use as directed    Syringe with Needle, Disp, 1 mL 25 gauge x 1\" syrg Once a day to fill V-GO    traMADoL (ULTRAM) 50 mg tablet take 1 tablet by mouth three times a day if needed for Breakthrough pain    Trelegy Ellipta 100-62.5-25 mcg inhaler inhale 1 puff by mouth and INTO THE LUNGS once daily Rinse mouth after use    zolpidem CR (AMBIEN CR) 12.5 mg, Oral, BEDTIME PRN       Allergies:   No Known Allergies    Social History:   Social History     Socioeconomic History    Marital status:    Tobacco Use    Smoking status: Former Smoker     Years: 30.00    Smokeless tobacco: Never Used   Substance and Sexual Activity    Alcohol use: Not Currently    Drug use: Never   Other Topics Concern       Family History:  Family History   Problem Relation Age of Onset    Diabetes Mother        Review of Systems:    Consitutional: denies fever, excessive weight gain or loss. Eyes: denies diplopia, eye pain. Integumentary: denies new concerning skin lesions. Ears, Nose, Mouth, Throat: denies except as per HPI. Endocrine: denies hot or cold intolerance, increased thirst.  Respiratory: denies cough, hemoptysis, wheezing  Gastrointestinal: denies trouble swallowing, nausea, emesis, regurgitation  Musculoskeletal: denies muscle weakness or wasting  Cardiovascular: denies chest pain, shortness of breath  Neurologic: denies seizures, numbness or tingling, syncope  Hematologic: denies easy bleeding or bruising    Physical Examination:   There were no vitals filed for this visit. General: Comfortable, pleasant, appears stated age  Voice: Strong, speaking in full sentences, no stridor    Ears: Thickened EAC meatus and dry flaky skin bilaterally. Complete, medial impactions of cerumen and wet skin debris bilaterally. Following debridement, TM slightly wet but appears intact with clear middle ear space. Oral, nasal and neck exams deferred in absence of symptoms due to COVID 19 pandemic    O2 nasal cannula     PROCEDURE: BILATERAL MICROSCOPY WITH CERUMEN DEBRIDEMENT    Using the microscope, both ears were examined.  A 5, 7 Fr suction were used to debride the ears of cerumen revealing a clear and intact TM bilaterally. Patient tolerated the procedure well       Assessment and Plan:   1. Bilateral ear canal dermatitis  2. Bilateral cerumen impactions  3. Bilateral otorrhea  4. Bilateral ear plugging  - Cerumen debrided with improvement in hearing  - Avoid q tips  - Dry ear precautions  - Add lotrisone to EAC meatus  - Use acetic acid gtt bilaterally in case of low grade fungal OE  - Follow up in 7-10 days for ear recheck             The patient was instructed to return to clinic if no improvement or progression of symptoms. Signs to watch out for reviewed.       MD Jocelynn Mathur [facility]  [address] [place] [address]  Office Phone: [phone]

## 2021-04-05 DIAGNOSIS — E11.65 TYPE 2 DIABETES MELLITUS WITH HYPERGLYCEMIA, WITH LONG-TERM CURRENT USE OF INSULIN (HCC): ICD-10-CM

## 2021-04-05 DIAGNOSIS — Z79.4 TYPE 2 DIABETES MELLITUS WITH HYPERGLYCEMIA, WITH LONG-TERM CURRENT USE OF INSULIN (HCC): ICD-10-CM

## 2021-04-23 ENCOUNTER — VIRTUAL VISIT (OUTPATIENT)
Dept: ENDOCRINOLOGY | Age: 56
End: 2021-04-23
Payer: COMMERCIAL

## 2021-04-23 DIAGNOSIS — Z79.4 TYPE 2 DIABETES MELLITUS WITH HYPERGLYCEMIA, WITH LONG-TERM CURRENT USE OF INSULIN (HCC): Primary | ICD-10-CM

## 2021-04-23 DIAGNOSIS — E11.65 TYPE 2 DIABETES MELLITUS WITH HYPERGLYCEMIA, WITH LONG-TERM CURRENT USE OF INSULIN (HCC): Primary | ICD-10-CM

## 2021-04-23 PROCEDURE — 99214 OFFICE O/P EST MOD 30 MIN: CPT | Performed by: INTERNAL MEDICINE

## 2021-04-23 RX ORDER — IPRATROPIUM BROMIDE AND ALBUTEROL SULFATE 2.5; .5 MG/3ML; MG/3ML
SOLUTION RESPIRATORY (INHALATION)
COMMUNITY
Start: 2021-03-10

## 2021-04-23 RX ORDER — ATORVASTATIN CALCIUM 80 MG/1
TABLET, FILM COATED ORAL
COMMUNITY
Start: 2021-04-10 | End: 2021-06-08

## 2021-04-23 NOTE — PROGRESS NOTES
History and Physical    Patient: Hunter Sampson MRN: [id number]  SSN: [id number]    YOB: 1965  Age: 49 y.o. Sex: female      Subjective:   Hunter Sampson, was evaluated through a synchronous (real-time) audio-video encounter. The patient (or guardian if applicable) is aware that this is a billable service. Verbal consent to proceed has been obtained within the past 12 months. The visit was conducted pursuant to the emergency declaration under the [facility], [address] [place] [address] authority and the [employer] Act. Patient identification was verified, and a caregiver was present when appropriate. The patient was located in a state where the provider was credentialed to provide care. Hunter Sampson is a 49 y.o. female with a past mental history of hypertension, hyperlipidemia, O2 and steroid dependent COPD is here for follow up of type 2 diabetes mellitus. She is in primary care of Dr. Peg Tsang. patient was diagnosed with diabetes in May 2019 during labs done as a part of annual physical.   Patient started using V-GO-40 around 4 weeks back. She is doing 5 clicks before each meal.  She denies any low blood sugars in between meals. She is back on Januvia  100 mg daily. She is using freestyle saúl and checking blood sugars several times per day. Readings are ranging from 87 to 219 mg/dL. Most of the readings are under 200 mg/dL per patient. She is back on prednisone 5 mg daily. She had labs done a few weeks back.     Glucometer reading: Not available    Updated diabetes history:  · Diagnosis: 1 month back (5/2019)    · Current treatment: V-GO 40 with 5 clicks before each meal, Januvia 100 mg daily    · Past treatment: metformin, metformin ER (nausea and stomach pain), glimepiride, Basaglar, Humalog    · Glucose checks: twice a day as above    · Hyperglycemia: no    · Hypoglycemia: no    · Meals per day: 3, breakfast: greek yogurt or oatmeal, lunch: jenny calenders/ burgers or sandwich, dinner: steak/chicken breast, vegetables, snacks: apples, orange, cantaloupe, popsicle, pretzels, chips, sugar free candy, regular soda rarely    · Exercise: walks at home on treadmill    · DM related hospitalizations: no        Complications of DM:    · CAD: no    · CVA: no    · PVD: no    · Amputations: no     · Retinopathy: no; last exam was 2020    · Gastropathy: no    · Nephropathy: no    · Neuropathy: no        Medications:    · Statin: Atorvastatin 80    · ACE-I: losartan    · ASA: no        · Diabetes education: no    Past Medical History:   Diagnosis Date    Body mass index 40.0-44.9, adult ([facility] 2020    Chronic obstructive lung disease ([facility] 2020    Chronic otitis externa 3/2/2020    Chronic otitis media 2020    Cyst of ovary 2020    Diabetes mellitus type 2, controlled ([facility] 2020    Endometriosis 2020    Essential hypertension 2020    Hypercholesterolemia 2020    Ingrown toenail 2020    Long term (current) use of systemic steroids 2020    Mixed hyperlipidemia 2020    Morbid obesity ([facility] 2020    Obesity     Obstructive sleep apnea of adult 2020    Tetralogy of Fallot 2020    Uncontrolled type 2 diabetes mellitus ([facility] 2020     Past Surgical History:   Procedure Laterality Date    HX BACK SURGERY      HX  SECTION      x2    HX HYSTERECTOMY      HX TONSILLECTOMY      HX TONSILLECTOMY      HX TUBAL LIGATION      [place] CARDIAC SURG PROCEDURE UNLIST        Family History   Problem Relation Age of Onset    Diabetes Mother      Social History     Tobacco Use    Smoking status: Former Smoker     Years: 30.00    Smokeless tobacco: Never Used   Substance Use Topics    Alcohol use: Not Currently      Prior to Admission medications    Medication Sig Start Date End Date Taking?  Authorizing Provider albuterol-ipratropium (DUO-NEB) 2.5 mg-0.5 mg/3 ml nebu inhale contents of 1 vial ( 3 milliliters ) in nebulizer by mouth. ..  (REFER TO PRESCRIPTION NOTES). 3/10/21  Yes Provider, Historical   atorvastatin (LIPITOR) 80 mg tablet take 1 tablet by mouth NIGHTLY (DISCONTINUE ATORVASTATIN 40 MG) 4/10/21  Yes Provider, Historical   clotrimazole-betamethasone (LOTRISONE) topical cream Apply to external ears twice daily x 1 week 3/29/21  Yes Arsen Andres MD   insulin lispro (HumaLOG U-100 Insulin) 100 unit/mL injection Use as directed in V-GO, max daily dose 90 units 3/8/21  Yes Betsy Tran MD   Syringe with Needle, Disp, 1 mL 25 gauge x 1\" syrg Once a day to fill V-GO 3/8/21  Yes Betsy Tran MD   meloxicam (MOBIC) 7.5 mg tablet take 1 tablet by mouth once daily 2/25/21  Yes Provider, Historical   flash glucose scanning reader (FreeStyle Davide 2 [place]) misc Check glucose 4 times a day 3/5/21  Yes Betsy Tran MD   insulin glargine (Basaglar KwikPen U-100 Insulin) 100 unit/mL (3 mL) inpn Inject 40 units daily 3/5/21  Yes Betsy Tran MD   sub-q insulin device, 40 unit (V-GO 40) may Use as directed 3/5/21  Yes Betsy Tran MD   flash glucose sensor (FreeStyle Davide 2 Sensor) kit Check glucose 4 times a day 2/22/21  Yes Betsy Tran MD   furosemide (LASIX) 40 mg tablet take 1 tablet by mouth twice a day if needed for EDEMA.  FOLLOW UP WITH DR Gwyn Sanchez 1/20/21  Yes Provider, Historical   glucose blood VI test strips (OneTouch Verio test strips) strip Use to check glucose twice a day 2/5/21  Yes Betsy Tran MD   lancets (OneTouch Delica Plus Lancet) 33 gauge misc Use to check glucose twice a day 2/5/21  Yes Betsy Tran MD   Insulin Needles, Disposable, (BD Ultra-Fine Short Pen Needle) 31 gauge x 5/16\" ndle Use to take insulin 4 times a day 12/28/20  Yes MD Abe Rodas Ellipbranden 100-62.5-25 mcg inhaler inhale 1 puff by mouth and INTO THE LUNGS once daily Rinse mouth after use 11/13/20  Yes Provider, Historical   metoprolol succinate (TOPROL-XL) 100 mg tablet take 1 tablet by mouth once daily 10/30/20  Yes Provider, Historical   traMADoL (ULTRAM) 50 mg tablet take 1 tablet by mouth three times a day if needed for Breakthrough pain 9/15/20  Yes Provider, Historical   PARoxetine mesylate,menop.sym, 7.5 mg cap  11/23/20  Yes Provider, Historical   predniSONE (DELTASONE) 5 mg tablet Take  by mouth. Yes Provider, Historical   amLODIPine (NORVASC) 5 mg tablet Take 5 mg by mouth daily. Yes Provider, Historical   glycopyrrolate-formoteroL (Bevespi Aerosphere) 9-4.8 mcg HFA inhaler Take 2 Puffs by inhalation two (2) times a day. Yes Provider, Historical   cyclobenzaprine (FLEXERIL) 10 mg tablet Take  by mouth three (3) times daily as needed for Muscle Spasm(s). Yes Provider, Historical   losartan (COZAAR) 100 mg tablet Take 100 mg by mouth daily. Yes Provider, Historical   albuterol (ProAir HFA) 90 mcg/actuation inhaler Take  by inhalation. Yes Provider, Historical   zolpidem CR (AMBIEN CR) 12.5 mg tablet Take 12.5 mg by mouth nightly as needed for Sleep. Yes Provider, Historical   levoFLOXacin (LEVAQUIN) 500 mg tablet Take  by mouth daily. Yes Provider, Historical   cholecalciferol, vitamin D3, (Vitamin D3) 50 mcg (2,000 unit) tab Take  by mouth. Yes Provider, Historical   nebivoloL (Bystolic) 5 mg tablet Take  by mouth daily. Yes Provider, Historical        No Known Allergies    Review of Systems:  ROS    A comprehensive review of systems was preformed and it is negative except mentioned in HPI    Objective: There were no vitals filed for this visit. Physical Exam:    Physical Exam  Constitutional:       Appearance: She is obese. HENT:      Head: Normocephalic and atraumatic. Pulmonary:      Effort: Pulmonary effort is normal.   Neurological:      Mental Status: She is alert. Labs and Imaging:    Results for Bridget Calvert (MRN [id number]) as of 4/23/2021 12:51   Ref.  Range 3/18/2021 10:25   Sodium Latest Ref Range: 134 - 144 mmol/L 143   Potassium Latest Ref Range: 3.5 - 5.2 mmol/L 4.2   Chloride Latest Ref Range: 96 - 106 mmol/L 100   CO2 Latest Ref Range: 20 - 29 mmol/L 28   Glucose Latest Ref Range: 65 - 99 mg/dL 153 (H)   BUN Latest Ref Range: 6 - 24 mg/dL 16   Creatinine Latest Ref Range: 0.57 - 1.00 mg/dL 0.72   BUN/Creatinine ratio Latest Ref Range: 9 - 23  22   Calcium Latest Ref Range: 8.7 - 10.2 mg/dL 9.8   GFR est non-AA Latest Ref Range: >59 mL/min/1.73 96   GFR est AA Latest Ref Range: >59 mL/min/1.73 111   Bilirubin, total Latest Ref Range: 0.0 - 1.2 mg/dL 0.7   Protein, total Latest Ref Range: 6.0 - 8.5 g/dL 7.3   Albumin Latest Ref Range: 3.8 - 4.9 g/dL 4.5   A-G Ratio Latest Ref Range: 1.2 - 2.2  1.6   ALT Latest Ref Range: 0 - 32 IU/L 23   AST Latest Ref Range: 0 - 40 IU/L 20   Alk. phosphatase Latest Ref Range: 39 - 117 IU/L 133 (H)   Results for Jayme Castaneda (MRN [id number]) as of 4/23/2021 12:51   Ref. Range 3/18/2021 10:25   Triglyceride Latest Ref Range: 0 - 149 mg/dL 127   Cholesterol, total Latest Ref Range: 100 - 199 mg/dL 175   HDL Cholesterol Latest Ref Range: >39 mg/dL 51   VLDL, calculated Latest Ref Range: 5 - 40 mg/dL 23   LDL, calculated Latest Ref Range: 0 - 99 mg/dL 101 (H)   Results for Jayme Castaneda (MRN [id number]) as of 4/23/2021 12:51   Ref. Range 3/18/2021 10:25   Creatinine, urine Latest Ref Range: Not Estab. mg/dL 74.3   Microalbumin, urine Latest Ref Range: Not Estab. ug/mL 39.1   Microalbumin/Creat. Ratio Latest Ref Range: 0 - 29 mg/g creat 53 (H)   Results for Jayme Castaneda (MRN [id number]) as of 4/23/2021 12:51   Ref. Range 3/18/2021 10:25   TSH Latest Ref Range: 0.450 - 4.500 uIU/mL 1.740     There were no vitals filed for this visit.      Lab Results   Component Value Date/Time    Hemoglobin A1c 8.9 (H) 09/09/2020 11:34 AM    Hemoglobin A1c, External 9.1 02/04/2020        Assessment:     Patient Active Problem List   Diagnosis Code    Body mass index 40.0-44.9, adult ([facility]) Z68.41    Chronic obstructive lung disease ([facility]) J44.9    Essential hypertension I10    Hypercholesterolemia E78.00    Ingrown toenail L60.0    Long term (current) use of systemic steroids Z79.52    Mixed hyperlipidemia E78.2    Morbid obesity ([facility]) E66.01    Obstructive sleep apnea of adult G47.33    Diabetes mellitus type 2, controlled ([facility]) E11.9    Uncontrolled type 2 diabetes mellitus ([facility]) E11.65    Obesity E66.9    Chronic otitis media H66.90    Cyst of ovary N83.209    Endometriosis N80.9    Tetralogy of Fallot Q21.3    Type 2 diabetes mellitus with hyperglycemia, with long-term current use of insulin ([facility]) E11.65, Z79.4    Chronic otitis externa H60.60           Plan:     type II diabetes mellitus uncontrolled  Hemoglobin A1c was 9.5% on 7091392, 6.4% on 8-, 9.1% on 2-4-2020, 8.9% on 9-9-2020, 9.7% in my office on 3-5-2021. Up to date with diabetes related annual labs: March 2021    Up to date with diabetic eye exam: yes    plan:  It appears that glucose control has improved and it appears to be acceptable. I am not making any changes today. Continue current medication doses and I will see her back in 2 months. not a good candidate for SGLT-2 inhibitor because of frequent UTI and vaginal yeast infections, not a good candidate for pioglitazone because of obesity, unable to tolerate metformin and metformin ER, not a candidate for GLP-1 agonist because she is on Januvia.    essential hypertension  Running normal at home. No microalbuminuria. Continue current medications. mixed hyperlipidemia  1-: LDL elevated at 128. Increased atorvastatin from 20 mg to 40 mg. LDL repeated in September 2020 was better but still elevated at 114. Increase atorvastatin to 80 mg daily at bedtime. March 2021 total cholesterol 175, triglycerides 127, . Continue atorvastatin 80 mg daily at bedtime.     morbid obesity  diet, unable to exercise. body mass index 40+ - severely obese    ingrowing toenail  Has been taken care of by podiatrist.    long-term current use of systemic steroid  missed appointment for DEXA. Patient is consuming 2-3 servings of dairy per day. vit D was low at 12 in 6/2019. unable to tolerate ergocalciferol and cholecalciferol. Advised patient to make appointment soon and advise provider to forward result to me. obstructive sleep apnea of adult  Unable to use CPAP because of sinus infection. Patient's pulmonologist is aware. No orders of the defined types were placed in this encounter.        Signed By: Katie Tavares MD     April 23, 2021      Return to clinic 2 months

## 2021-06-23 ENCOUNTER — TELEPHONE (OUTPATIENT)
Dept: ENDOCRINOLOGY | Age: 56
End: 2021-06-23

## 2021-06-23 NOTE — TELEPHONE ENCOUNTER
Spoke to patient. She is having random episodes of lightheadedness. This could happen even when she is sitting. During the episodes her blood glucose is not low, blood pressure is okay but sometimes her oxygen could be low in the 80s.   Advised patient to talk to her pulmonologist.

## 2021-07-08 ENCOUNTER — TELEPHONE (OUTPATIENT)
Dept: ENDOCRINOLOGY | Age: 56
End: 2021-07-08

## 2021-07-08 DIAGNOSIS — E11.65 TYPE 2 DIABETES MELLITUS WITH HYPERGLYCEMIA, WITH LONG-TERM CURRENT USE OF INSULIN (HCC): ICD-10-CM

## 2021-07-08 DIAGNOSIS — Z79.4 TYPE 2 DIABETES MELLITUS WITH HYPERGLYCEMIA, WITH LONG-TERM CURRENT USE OF INSULIN (HCC): ICD-10-CM

## 2021-07-08 RX ORDER — FLASH GLUCOSE SENSOR
KIT MISCELLANEOUS
Qty: 2 KIT | Refills: 5 | Status: SHIPPED | OUTPATIENT
Start: 2021-07-08 | End: 2021-07-09 | Stop reason: SDUPTHER

## 2021-07-08 NOTE — TELEPHONE ENCOUNTER
Patient called in stated that she needed a prescription for refills on her [facility] sensors

## 2021-07-09 ENCOUNTER — OFFICE VISIT (OUTPATIENT)
Dept: ENDOCRINOLOGY | Age: 56
End: 2021-07-09
Payer: COMMERCIAL

## 2021-07-09 VITALS
BODY MASS INDEX: 51.38 KG/M2 | TEMPERATURE: 98.4 F | HEART RATE: 73 BPM | HEIGHT: 63 IN | OXYGEN SATURATION: 91 % | SYSTOLIC BLOOD PRESSURE: 153 MMHG | WEIGHT: 290 LBS | DIASTOLIC BLOOD PRESSURE: 86 MMHG

## 2021-07-09 DIAGNOSIS — E11.65 TYPE 2 DIABETES MELLITUS WITH HYPERGLYCEMIA, WITH LONG-TERM CURRENT USE OF INSULIN (HCC): Primary | ICD-10-CM

## 2021-07-09 DIAGNOSIS — Z79.4 TYPE 2 DIABETES MELLITUS WITH HYPERGLYCEMIA, WITH LONG-TERM CURRENT USE OF INSULIN (HCC): Primary | ICD-10-CM

## 2021-07-09 LAB
GLUCOSE POC: 209 MG/DL
HBA1C MFR BLD HPLC: 8.4 %

## 2021-07-09 PROCEDURE — 83036 HEMOGLOBIN GLYCOSYLATED A1C: CPT | Performed by: INTERNAL MEDICINE

## 2021-07-09 PROCEDURE — 3052F HG A1C>EQUAL 8.0%<EQUAL 9.0%: CPT | Performed by: INTERNAL MEDICINE

## 2021-07-09 PROCEDURE — 99214 OFFICE O/P EST MOD 30 MIN: CPT | Performed by: INTERNAL MEDICINE

## 2021-07-09 PROCEDURE — 82962 GLUCOSE BLOOD TEST: CPT | Performed by: INTERNAL MEDICINE

## 2021-07-09 PROCEDURE — 95251 CONT GLUC MNTR ANALYSIS I&R: CPT | Performed by: INTERNAL MEDICINE

## 2021-07-09 RX ORDER — FLASH GLUCOSE SENSOR
KIT MISCELLANEOUS
Qty: 2 KIT | Refills: 5 | Status: SHIPPED | OUTPATIENT
Start: 2021-07-09 | End: 2021-11-09 | Stop reason: SDUPTHER

## 2021-07-09 RX ORDER — INSULIN LISPRO 100 [IU]/ML
INJECTION, SOLUTION INTRAVENOUS; SUBCUTANEOUS
Qty: 30 ML | Refills: 5 | Status: SHIPPED | OUTPATIENT
Start: 2021-07-09 | End: 2021-08-26 | Stop reason: SDUPTHER

## 2021-07-09 NOTE — LETTER
7/9/2021    Patient: Sofia Naranjo   YOB: 1965   Date of Visit: 7/9/2021     [phone]  Via Fax: [phone]    Dear Daniel Capone MD,      Thank you for referring Ms. Justo Ventura to [address] [place] [address] for evaluation. My notes for this consultation are attached. If you have questions, please do not hesitate to call me. I look forward to following your patient along with you.       Sincerely,    Irma Marroquin MD

## 2021-07-09 NOTE — PROGRESS NOTES
History and Physical    Patient: Angella Weiner MRN: [id number]  SSN: [id number]    YOB: 1965  Age: 49 y.o. Sex: female      Subjective:     Angella Weiner is a 49 y.o. female with a past mental history of hypertension, hyperlipidemia, O2 and steroid dependent COPD is here for follow up of type 2 diabetes mellitus. She is in primary care of Dr. Jasmin Robert. patient was diagnosed with diabetes in May 2019 during labs done as a part of annual physical.   Patient started using V-GO-40 in March 2021. She is doing 5 clicks before each meal.  She denies any low blood sugars in between meals. She is back on Januvia  100 mg daily. She is using freestyle saúl and checking blood sugars several times per day. She was admitted a few weeks back to the hospital for COPD exacerbation and she was treated with high-dose steroids. Now she is back on prednisone 5 mg daily. She has gained 15 pounds since last visit. She is going to see Dr. Lupis Guzman for getting evaluated for bariatric surgery. She has appointment with her pulmonologist prior to seeing the surgeon, to see if she is a candidate to go ahead with general anesthesia. Glucometer reading: Freestyle saúl was downloaded and reviewed with patient. Please see procedure note below.     Updated diabetes history:  · Diagnosis: 1 month back (5/2019)    · Current treatment: V-GO 40 with 5 clicks before each meal, Januvia 100 mg daily    · Past treatment: metformin, metformin ER (nausea and stomach pain), glimepiride, Basaglar, Humalog    · Glucose checks: twice a day as above    · Hyperglycemia: no    · Hypoglycemia: no    · Meals per day: 3, breakfast: greek yogurt or oatmeal, lunch: jenny calenders/ burgers or sandwich, dinner: steak/chicken breast, vegetables, snacks: apples, orange, cantaloupe, popsicle, pretzels, chips, sugar free candy, regular soda rarely    · Exercise: walks at home on treadmill    · DM related hospitalizations: no        Complications of DM:    · CAD: no    · CVA: no    · PVD: no    · Amputations: no     · Retinopathy: no; last exam was 2020    · Gastropathy: no    · Nephropathy: no    · Neuropathy: no        Medications:    · Statin: Atorvastatin 80    · ACE-I: losartan    · ASA: no        · Diabetes education: no    Past Medical History:   Diagnosis Date    Body mass index 40.0-44.9, adult ([facility] 2020    Chronic obstructive lung disease ([facility] [address] 2020    Chronic otitis externa 3/2/2020    Chronic otitis media 2020    Cyst of ovary 2020    Diabetes mellitus type 2, controlled ([facility] [address] 2020    Endometriosis 2020    Essential hypertension 2020    Hypercholesterolemia 2020    Ingrown toenail 2020    Long term (current) use of systemic steroids 2020    Mixed hyperlipidemia 2020    Morbid obesity ([facility] [address] 2020    Obesity     Obstructive sleep apnea of adult 2020    Tetralogy of Fallot 2020    Uncontrolled type 2 diabetes mellitus ([facility] [address] 2020     Past Surgical History:   Procedure Laterality Date    HX BACK SURGERY      HX  SECTION      x2    HX HYSTERECTOMY      HX TONSILLECTOMY      HX TONSILLECTOMY      HX TUBAL LIGATION      [place] CARDIAC SURG PROCEDURE UNLIST        Family History   Problem Relation Age of Onset    Diabetes Mother      Social History     Tobacco Use    Smoking status: Former Smoker     Years: 30.00    Smokeless tobacco: Never Used   Substance Use Topics    Alcohol use: Not Currently      Prior to Admission medications    Medication Sig Start Date End Date Taking? Authorizing Provider   SITagliptin (Januvia) 100 mg tablet Take 1 Tablet by mouth daily for 30 days. 21 Yes Jaycee Colorado MD   insulin lispro (HumaLOG U-100 Insulin) 100 unit/mL injection inject as directed PER VGO.  MAXIMUM DAILY UNITS 90 21  Yes Jaycee Colorado MD   flash glucose sensor (FreeStyle Davide 2 Sensor) kit Check glucose 4 times a day 7/9/21  Yes Argelia Jennings MD   V-GO 40 may use as directed 6/21/21  Yes Argelia Jennings MD   atorvastatin (LIPITOR) 80 mg tablet Take 1 Tablet by mouth nightly for 90 days. 6/8/21 9/6/21 Yes Argelia Jennings MD   albuterol-ipratropium (DUO-NEB) 2.5 mg-0.5 mg/3 ml nebu inhale contents of 1 vial ( 3 milliliters ) in nebulizer by mouth. ..  (REFER TO PRESCRIPTION NOTES). 3/10/21  Yes Provider, Historical   clotrimazole-betamethasone (LOTRISONE) topical cream Apply to external ears twice daily x 1 week 3/29/21  Yes Darylene Samples, MD   Syringe with Needle, Disp, 1 mL 25 gauge x 1\" syrg Once a day to fill V-GO 3/8/21  Yes Argelia Jennings MD   meloxicam (MOBIC) 7.5 mg tablet take 1 tablet by mouth once daily 2/25/21  Yes Provider, Historical   flash glucose scanning reader ([employer] Davide 2 [place]) misc Check glucose 4 times a day 3/5/21  Yes Argelia Jennings MD   insulin glargine (Basaglar KwikPen U-100 Insulin) 100 unit/mL (3 mL) inpn Inject 40 units daily 3/5/21  Yes Argelia Jennings MD   furosemide (LASIX) 40 mg tablet take 1 tablet by mouth twice a day if needed for EDEMA.  FOLLOW UP WITH DR Isaiah Cutler 1/20/21  Yes Provider, Historical   glucose blood VI test strips (OneTouch Verio test strips) strip Use to check glucose twice a day 2/5/21  Yes Argelia Jennings MD   lancets (OneTouch Delica Plus Lancet) 33 gauge misc Use to check glucose twice a day 2/5/21  Yes Argelia Jennings MD   Insulin Needles, Disposable, (BD Ultra-Fine Short Pen Needle) 31 gauge x 5/16\" ndle Use to take insulin 4 times a day 12/28/20  Yes Argelia Jennings MD   Trelegy Ellipta 100-62.5-25 mcg inhaler inhale 1 puff by mouth and INTO THE LUNGS once daily Rinse mouth after use 11/13/20  Yes Provider, Historical   metoprolol succinate (TOPROL-XL) 100 mg tablet take 1 tablet by mouth once daily 10/30/20  Yes Provider, Historical   traMADoL (ULTRAM) 50 mg tablet take 1 tablet by mouth three times a day if needed for Breakthrough pain 9/15/20  Yes Provider, Historical   PARoxetine mesylate,menop.sym, 7.5 mg cap  11/23/20  Yes Provider, Historical   predniSONE (DELTASONE) 5 mg tablet Take  by mouth. Yes Provider, Historical   amLODIPine (NORVASC) 5 mg tablet Take 5 mg by mouth daily. Yes Provider, Historical   glycopyrrolate-formoteroL (Bevespi Aerosphere) 9-4.8 mcg HFA inhaler Take 2 Puffs by inhalation two (2) times a day. Yes Provider, Historical   cyclobenzaprine (FLEXERIL) 10 mg tablet Take  by mouth three (3) times daily as needed for Muscle Spasm(s). Yes Provider, Historical   losartan (COZAAR) 100 mg tablet Take 100 mg by mouth daily. Yes Provider, Historical   albuterol (ProAir HFA) 90 mcg/actuation inhaler Take  by inhalation. Yes Provider, Historical   zolpidem CR (AMBIEN CR) 12.5 mg tablet Take 12.5 mg by mouth nightly as needed for Sleep. Yes Provider, Historical   levoFLOXacin (LEVAQUIN) 500 mg tablet Take  by mouth daily. Yes Provider, Historical   cholecalciferol, vitamin D3, (Vitamin D3) 50 mcg (2,000 unit) tab Take  by mouth. Yes Provider, Historical   nebivoloL (Bystolic) 5 mg tablet Take  by mouth daily. Yes Provider, Historical        No Known Allergies    Review of Systems:  ROS    A comprehensive review of systems was preformed and it is negative except mentioned in HPI    Objective:     Vitals:    07/09/21 1506 07/09/21 1517   BP: (!) 152/82 (!) 153/86   Pulse: 73 73   Temp: 98.4 °F (36.9 °C)    TempSrc: Oral    SpO2: 91%    Weight: 290 lb (131.5 kg)    Height: 5' 3\" (1.6 m)         Physical Exam:    Physical Exam  Constitutional:       Appearance: She is obese. HENT:      Head: Normocephalic and atraumatic. Cardiovascular:      Rate and Rhythm: Normal rate and regular rhythm. Pulmonary:      Effort: Pulmonary effort is normal.      Breath sounds: Normal breath sounds. Neurological:      Mental Status: She is alert.        Procedure note:  CGM download: Sushant Allen was downloaded from 6--7-9-2021  CGMS TRACING PATTERNS    Overnight Trends (the most consistent pattern): Hyperglycemia       prandial trends: Euglycemia    Additional Comments  See CGMS download and CGMS Patient Log in     CGMS RECOMMENDATIONS    Medication Changes: Continue V-Go 40, add Basaglar 5 units daily    Labs and Imaging:    Results for Lyly Ferreira (MRN [id number]) as of 4/23/2021 12:51   Ref. Range 3/18/2021 10:25   Sodium Latest Ref Range: 134 - 144 mmol/L 143   Potassium Latest Ref Range: 3.5 - 5.2 mmol/L 4.2   Chloride Latest Ref Range: 96 - 106 mmol/L 100   CO2 Latest Ref Range: 20 - 29 mmol/L 28   Glucose Latest Ref Range: 65 - 99 mg/dL 153 (H)   BUN Latest Ref Range: 6 - 24 mg/dL 16   Creatinine Latest Ref Range: 0.57 - 1.00 mg/dL 0.72   BUN/Creatinine ratio Latest Ref Range: 9 - 23  22   Calcium Latest Ref Range: 8.7 - 10.2 mg/dL 9.8   GFR est non-AA Latest Ref Range: >59 mL/min/1.73 96   GFR est AA Latest Ref Range: >59 mL/min/1.73 111   Bilirubin, total Latest Ref Range: 0.0 - 1.2 mg/dL 0.7   Protein, total Latest Ref Range: 6.0 - 8.5 g/dL 7.3   Albumin Latest Ref Range: 3.8 - 4.9 g/dL 4.5   A-G Ratio Latest Ref Range: 1.2 - 2.2  1.6   ALT Latest Ref Range: 0 - 32 IU/L 23   AST Latest Ref Range: 0 - 40 IU/L 20   Alk. phosphatase Latest Ref Range: 39 - 117 IU/L 133 (H)   Results for Lyly Ferreira (MRN [id number]) as of 4/23/2021 12:51   Ref. Range 3/18/2021 10:25   Triglyceride Latest Ref Range: 0 - 149 mg/dL 127   Cholesterol, total Latest Ref Range: 100 - 199 mg/dL 175   HDL Cholesterol Latest Ref Range: >39 mg/dL 51   VLDL, calculated Latest Ref Range: 5 - 40 mg/dL 23   LDL, calculated Latest Ref Range: 0 - 99 mg/dL 101 (H)   Results for Lyly Ferreira (MRN [id number]) as of 4/23/2021 12:51   Ref. Range 3/18/2021 10:25   Creatinine, urine Latest Ref Range: Not Estab. mg/dL 74.3   Microalbumin, urine Latest Ref Range: Not Estab. ug/mL 39.1   Microalbumin/Creat.  Ratio Latest Ref Range: 0 - 29 mg/g creat 53 (H)   Results for Alan Antoine (MRN [id number]) as of 4/23/2021 12:51   Ref. Range 3/18/2021 10:25   TSH Latest Ref Range: 0.450 - 4.500 uIU/mL 1.740     Last 3 Recorded Weights in this Encounter    07/09/21 1506   Weight: 290 lb (131.5 kg)        Lab Results   Component Value Date/Time    Hemoglobin A1c 8.9 (H) 09/09/2020 11:34 AM    Hemoglobin A1c, External 9.1 02/04/2020 12:00 AM        Assessment:     Patient Active Problem List   Diagnosis Code    Body mass index 40.0-44.9, adult ([address] Z68.41    Chronic obstructive lung disease ([address] J44.9    Essential hypertension I10    Hypercholesterolemia E78.00    Ingrown toenail L60.0    Long term (current) use of systemic steroids Z79.52    Mixed hyperlipidemia E78.2    Morbid obesity ([address] E66.01    Obstructive sleep apnea of adult G47.33    Diabetes mellitus type 2, controlled ([address] E11.9    Uncontrolled type 2 diabetes mellitus ([address] E11.65    Obesity E66.9    Chronic otitis media H66.90    Cyst of ovary N83.209    Endometriosis N80.9    Tetralogy of Fallot Q21.3    Type 2 diabetes mellitus with hyperglycemia, with long-term current use of insulin ([facility]) E11.65, Z79.4    Chronic otitis externa H60.60           Plan:     type II diabetes mellitus uncontrolled  Hemoglobin A1c was 9.5% on 1574381, 6.4% on 8-, 9.1% on 2-4-2020, 8.9% on 9-9-2020, 9.7% in my office on 3-5-2021, 8.4% today. Fingerstick blood glucose 209 mg/dL in my office today. Up to date with diabetes related annual labs: March 2021    Up to date with diabetic eye exam: yes    plan:  Patient mainly has fasting hyperglycemia. She is on maximum dose of V-Go 40. I will add Basaglar 5 units every day to her regimen so she will get total 45 units of basal insulin. Continue current clicks with V-Go prior to each meal.  I will see her back in 6 weeks. Continue Januvia 100 mg daily.     not a good candidate for SGLT-2 inhibitor because of frequent UTI and vaginal yeast infections, not a good candidate for pioglitazone because of obesity, unable to tolerate metformin and metformin ER, not a candidate for GLP-1 agonist because she is on Januvia.    essential hypertension  Running normal at home. No microalbuminuria. Continue current medications. mixed hyperlipidemia  1-: LDL elevated at 128. Increased atorvastatin from 20 mg to 40 mg. LDL repeated in September 2020 was better but still elevated at 114. Increase atorvastatin to 80 mg daily at bedtime. March 2021 total cholesterol 175, triglycerides 127, . Continue atorvastatin 80 mg daily at bedtime. morbid obesity  diet, unable to exercise. Patient is interested in bariatric surgery. Not sure if she is a candidate because of oxygen dependent COPD.  body mass index 40+ - severely obese    ingrowing toenail  Has been taken care of by podiatrist.    long-term current use of systemic steroid  missed appointment for DEXA. Patient is consuming 2-3 servings of dairy per day. vit D was low at 12 in 6/2019. unable to tolerate ergocalciferol and cholecalciferol. Advised patient to make appointment soon and advise provider to forward result to me. obstructive sleep apnea of adult  Unable to use CPAP because of sinus infection. Patient's pulmonologist is aware. Orders Placed This Encounter    AMB POC GLUCOSE BLOOD, BY GLUCOSE MONITORING DEVICE    AMB POC HEMOGLOBIN A1C    FL CONTINUOUS GLUCOSE MONITORING ANALYSIS I&R    SITagliptin (Januvia) 100 mg tablet     Sig: Take 1 Tablet by mouth daily for 30 days. Dispense:  90 Tablet     Refill:  4    insulin lispro (HumaLOG U-100 Insulin) 100 unit/mL injection     Sig: inject as directed PER VGO.  MAXIMUM DAILY UNITS 90     Dispense:  30 mL     Refill:  5    flash glucose sensor (FreeStyle Davide 2 Sensor) kit     Sig: Check glucose 4 times a day     Dispense:  2 Kit     Refill:  5        Signed By: Rikki Howard MD     July 9, 2021      Return to clinic 6 weeks

## 2021-08-03 ENCOUNTER — OFFICE VISIT (OUTPATIENT)
Dept: OBGYN CLINIC | Age: 56
End: 2021-08-03
Payer: COMMERCIAL

## 2021-08-03 VITALS
WEIGHT: 285 LBS | BODY MASS INDEX: 50.5 KG/M2 | DIASTOLIC BLOOD PRESSURE: 71 MMHG | SYSTOLIC BLOOD PRESSURE: 143 MMHG | HEIGHT: 63 IN

## 2021-08-03 DIAGNOSIS — N76.0 VAGINITIS AND VULVOVAGINITIS: ICD-10-CM

## 2021-08-03 DIAGNOSIS — Z90.710 VAGINAL PAP SMEAR FOLLOWING HYSTERECTOMY FOR MALIGNANCY: Primary | ICD-10-CM

## 2021-08-03 DIAGNOSIS — Z08 VAGINAL PAP SMEAR FOLLOWING HYSTERECTOMY FOR MALIGNANCY: Primary | ICD-10-CM

## 2021-08-03 DIAGNOSIS — Z11.51 SCREENING FOR HUMAN PAPILLOMAVIRUS: ICD-10-CM

## 2021-08-03 DIAGNOSIS — L73.9 FOLLICULITIS: ICD-10-CM

## 2021-08-03 PROCEDURE — 99396 PREV VISIT EST AGE 40-64: CPT | Performed by: OBSTETRICS & GYNECOLOGY

## 2021-08-03 RX ORDER — CEPHALEXIN 500 MG/1
500 CAPSULE ORAL 4 TIMES DAILY
Qty: 28 CAPSULE | Refills: 0 | Status: SHIPPED | OUTPATIENT
Start: 2021-08-03 | End: 2021-08-10

## 2021-08-03 RX ORDER — TERCONAZOLE 8 MG/G
1 CREAM VAGINAL
Qty: 1 TUBE | Refills: 3 | Status: SHIPPED | OUTPATIENT
Start: 2021-08-03 | End: 2021-08-06

## 2021-08-03 NOTE — PROGRESS NOTES
Chad Vazquez is a 49 y.o. female who presents today for the following:  Chief Complaint   Patient presents with    Annual Exam     Pt presents for annual exam with complaints of recurrent yeast infections //MKeeley         No Known Allergies    Current Outpatient Medications   Medication Sig    terconazole (TERAZOL 3) 0.8 % vaginal cream Insert 1 Applicator into vagina nightly for 3 days.  cephALEXin (Keflex) 500 mg capsule Take 1 Capsule by mouth four (4) times daily for 7 days.  SITagliptin (Januvia) 100 mg tablet Take 1 Tablet by mouth daily for 30 days.  insulin lispro (HumaLOG U-100 Insulin) 100 unit/mL injection inject as directed PER VGO. MAXIMUM DAILY UNITS 90    flash glucose sensor (FreeStyle Davide 2 Sensor) kit Check glucose 4 times a day    V-GO 40 may use as directed    atorvastatin (LIPITOR) 80 mg tablet Take 1 Tablet by mouth nightly for 90 days.  albuterol-ipratropium (DUO-NEB) 2.5 mg-0.5 mg/3 ml nebu inhale contents of 1 vial ( 3 milliliters ) in nebulizer by mouth. ..  (REFER TO PRESCRIPTION NOTES).  clotrimazole-betamethasone (LOTRISONE) topical cream Apply to external ears twice daily x 1 week    Syringe with Needle, Disp, 1 mL 25 gauge x 1\" syrg Once a day to fill V-GO    meloxicam (MOBIC) 7.5 mg tablet take 1 tablet by mouth once daily    flash glucose scanning reader (FreeStyle Davide 2 [place]) misc Check glucose 4 times a day    insulin glargine (Basaglar KwikPen U-100 Insulin) 100 unit/mL (3 mL) inpn Inject 40 units daily    furosemide (LASIX) 40 mg tablet take 1 tablet by mouth twice a day if needed for EDEMA.  FOLLOW UP WITH DR Keara Machado    glucose blood VI test strips (OneTouch Verio test strips) strip Use to check glucose twice a day    lancets (OneTouch Delica Plus Lancet) 33 gauge misc Use to check glucose twice a day    Insulin Needles, Disposable, (BD Ultra-Fine Short Pen Needle) 31 gauge x 5/16\" ndle Use to take insulin 4 times a day    Trelegy Ellipta 100-62.5-25 mcg inhaler inhale 1 puff by mouth and INTO THE LUNGS once daily Rinse mouth after use    metoprolol succinate (TOPROL-XL) 100 mg tablet take 1 tablet by mouth once daily    traMADoL (ULTRAM) 50 mg tablet take 1 tablet by mouth three times a day if needed for Breakthrough pain    PARoxetine mesylate,menop.sym, 7.5 mg cap     predniSONE (DELTASONE) 5 mg tablet Take  by mouth.  amLODIPine (NORVASC) 5 mg tablet Take 5 mg by mouth daily.  glycopyrrolate-formoteroL (Bevespi Aerosphere) 9-4.8 mcg HFA inhaler Take 2 Puffs by inhalation two (2) times a day.  cyclobenzaprine (FLEXERIL) 10 mg tablet Take  by mouth three (3) times daily as needed for Muscle Spasm(s).  losartan (COZAAR) 100 mg tablet Take 100 mg by mouth daily.  albuterol (ProAir HFA) 90 mcg/actuation inhaler Take  by inhalation.  zolpidem CR (AMBIEN CR) 12.5 mg tablet Take 12.5 mg by mouth nightly as needed for Sleep.  levoFLOXacin (LEVAQUIN) 500 mg tablet Take  by mouth daily.  cholecalciferol, vitamin D3, (Vitamin D3) 50 mcg (2,000 unit) tab Take  by mouth.  nebivoloL (Bystolic) 5 mg tablet Take  by mouth daily. No current facility-administered medications for this visit.        Past Medical History:   Diagnosis Date    Body mass index 40.0-44.9, adult ([facility] [address] 8/26/2020    Chronic obstructive lung disease ([facility] [address] 8/26/2020    Chronic otitis externa 3/2/2020    Chronic otitis media 8/26/2020    Cyst of ovary 8/26/2020    Diabetes mellitus type 2, controlled ([address] 8/26/2020    Endometriosis 8/26/2020    Essential hypertension 8/26/2020    Hypercholesterolemia 8/26/2020    Ingrown toenail 8/26/2020    Long term (current) use of systemic steroids 8/26/2020    Mixed hyperlipidemia 8/26/2020    Morbid obesity ([address] 8/26/2020    Obesity     Obstructive sleep apnea of adult 8/26/2020    Tetralogy of Fallot 8/26/2020    Uncontrolled type 2 diabetes mellitus ([address] 8/26/2020       Past Surgical History: Procedure Laterality Date    HX BACK SURGERY      HX  SECTION      x2    HX HYSTERECTOMY      HX TONSILLECTOMY      HX TONSILLECTOMY      HX TUBAL LIGATION      [place] CARDIAC SURG PROCEDURE UNLIST         Family History   Problem Relation Age of Onset    Diabetes Mother        Social History     Socioeconomic History    Marital status:      Spouse name: Not on file    Number of children: Not on file    Years of education: Not on file    Highest education level: Not on file   Occupational History    Not on file   Tobacco Use    Smoking status: Former Smoker     Years: 30.00    Smokeless tobacco: Never Used   Vaping Use    Vaping Use: Never used   Substance and Sexual Activity    Alcohol use: Not Currently    Drug use: Never    Sexual activity: Not on file   Other Topics Concern   [address] Service Not Asked    Blood Transfusions Not Asked    Caffeine Concern Not Asked    Occupational Exposure Not Asked    Hobby Hazards Not Asked    Sleep Concern Not Asked    Stress Concern Not Asked    Weight Concern Not Asked    Special Diet Not Asked    Back Care Not Asked    Exercise Not Asked    Bike Helmet Not Asked    Seat Belt Not Asked    Self-Exams Not Asked   Social History Narrative    Not on file     Social Determinants of Health     Financial Resource Strain:     Difficulty of Paying Living Expenses:    Food Insecurity:     Worried About Running Out of Food in the Last Year:     Ran Out of Food in the Last Year:    Transportation Needs:     Lack of Transportation (Medical):      Lack of Transportation (Non-Medical):    Physical Activity:     Days of Exercise per Week:     Minutes of Exercise per Session:    Stress:     Feeling of Stress :    Social Connections:     Frequency of Communication with Friends and Family:     Frequency of Social Gatherings with Friends and Family:     Attends Mu-ism Services:     Active Member of Clubs or Organizations:     Attends Club or Organization Meetings:     Marital Status:    Intimate Partner Violence:     Fear of Current or Ex-Partner:     Emotionally Abused:     Physically Abused:     Sexually Abused:          ROS   Review of Systems   Constitutional: Negative. HENT: Negative. Eyes: Negative. Respiratory: Negative. Cardiovascular: Negative. Gastrointestinal: Negative. Genitourinary: Negative. Musculoskeletal: Negative. Skin: Negative. Neurological: Negative. Endo/Heme/Allergies: Negative. Psychiatric/Behavioral: Negative. BP (!) 143/71   Ht 5' 3\" (1.6 m)   Wt 285 lb (129.3 kg)   BMI 50.49 kg/m²    OBGyn Exam   Constitutional  · Appearance: well-nourished, well developed, alert, in no acute distress    HENT  · Head and Face: appears normal    Neck  · Inspection/Palpation: normal appearance, no masses or tenderness  · Lymph Nodes: no lymphadenopathy present  · Thyroid: gland size normal, nontender, no nodules or masses present on palpation    Breasts   Symmetric, no palpable masses, no tenderness, no skin changes, no nipple abnormality, no nipple discharge, no lymphadenopathy.     Chest  · Respiratory Effort: breathing labored  · Auscultation: normal breath sounds    Cardiovascular  · Heart:  · Auscultation: regular rate and rhythm without murmur    Gastrointestinal  · Abdominal Examination: abdomen non-tender to palpation, normal bowel sounds, no masses present  · Liver and spleen: no hepatomegaly present, spleen not palpable  · Hernias: no hernias identified    Genitourinary  · External Genitalia: normal appearance for age, + discharge present, no tenderness present, no inflammatory lesions present, no masses present, no atrophy present  · Vagina: normal vaginal vault without central or paravaginal defects, no discharge present, no inflammatory lesions present, no masses present  · Bladder: non-tender to palpation  · Urethra: appears normal  · Cervix: ABSENT  · Uterus: ABSENT  · Adnexa: no adnexal tenderness present, no adnexal masses present  · Perineum: perineum within normal limits, no evidence of trauma, no rashes or skin lesions present  · Anus: anus within normal limits, no hemorrhoids present  · Inguinal Lymph Nodes: no lymphadenopathy present    Skin  · General Inspection: no rash, FOLICULLITIS    Neurologic/Psychiatric  · Mental Status:  · Orientation: grossly oriented to person, place and time  · Mood and Affect: mood normal, affect appropriate    No results found for this visit on 08/03/21. Orders Placed This Encounter    terconazole (TERAZOL 3) 0.8 % vaginal cream     Sig: Insert 1 Applicator into vagina nightly for 3 days. Dispense:  1 Tube     Refill:  3    cephALEXin (Keflex) 500 mg capsule     Sig: Take 1 Capsule by mouth four (4) times daily for 7 days. Dispense:  28 Capsule     Refill:  0    PAP IG, RFX APTIMA HPV ASCUS ([id number])     Order Specific Question:   Pap Source? Answer:   Vaginal     Order Specific Question:   Total Hysterectomy? Answer:   Yes     Order Specific Question:   Supracervical Hysterectomy? Answer:   Yes     Order Specific Question:   Post Menopausal?     Answer:   Yes     Order Specific Question:   Hormone Therapy? Answer:   No     Order Specific Question:   IUD? Answer:   No     Order Specific Question:   Abnormal Bleeding? Answer:   No     Order Specific Question:   Pregnant     Answer:   No     Order Specific Question:   Post Partum? Answer:   No     50 YO ANNUAL  HX OF HYSTERECTOMY  OBESITY  COPD  STEROID USE  DM, NOT WELL CONTROLLED WITH FREQUENT YEAST    1. Vaginal Pap smear following hysterectomy for malignancy    - PAP IG, RFX APTIMA HPV ASCUS ([id number])    2. Screening for human papillomavirus    - PAP IG, RFX APTIMA HPV ASCUS ([id number])    3. Folliculitis    - cephALEXin (Keflex) 500 mg capsule; Take 1 Capsule by mouth four (4) times daily for 7 days. Dispense: 28 Capsule; Refill: 0    4.  Vaginitis and vulvovaginitis    - terconazole (TERAZOL 3) 0.8 % vaginal cream; Insert 1 Applicator into vagina nightly for 3 days.   Dispense: 1 Tube; Refill: 3

## 2021-08-04 DIAGNOSIS — N95.1 MENOPAUSAL SYNDROME: Primary | ICD-10-CM

## 2021-08-04 RX ORDER — PAROXETINE 7.5 MG/1
1 CAPSULE ORAL DAILY
Qty: 30 CAPSULE | Refills: 5 | Status: SHIPPED | OUTPATIENT
Start: 2021-08-04 | End: 2022-09-16

## 2021-08-05 LAB
CYTOLOGIST CVX/VAG CYTO: NORMAL
CYTOLOGY CVX/VAG DOC CYTO: NORMAL
CYTOLOGY CVX/VAG DOC THIN PREP: NORMAL
DX ICD CODE: NORMAL
LABCORP, 190119: NORMAL
Lab: NORMAL
OTHER STN SPEC: NORMAL
STAT OF ADQ CVX/VAG CYTO-IMP: NORMAL

## 2021-08-25 ENCOUNTER — HOSPITAL ENCOUNTER (OUTPATIENT)
Dept: MAMMOGRAPHY | Age: 56
Discharge: HOME | End: 2021-08-25
Attending: OBSTETRICS & GYNECOLOGY
Payer: COMMERCIAL

## 2021-08-25 DIAGNOSIS — R92.8 ABNORMAL MAMMOGRAM: ICD-10-CM

## 2021-08-25 PROCEDURE — 77061 BREAST TOMOSYNTHESIS UNI: CPT

## 2021-08-25 PROCEDURE — 76642 ULTRASOUND BREAST LIMITED: CPT

## 2021-08-26 ENCOUNTER — OFFICE VISIT (OUTPATIENT)
Dept: ENDOCRINOLOGY | Age: 56
End: 2021-08-26
Payer: COMMERCIAL

## 2021-08-26 VITALS
BODY MASS INDEX: 51.12 KG/M2 | SYSTOLIC BLOOD PRESSURE: 152 MMHG | OXYGEN SATURATION: 94 % | DIASTOLIC BLOOD PRESSURE: 86 MMHG | WEIGHT: 288.5 LBS | TEMPERATURE: 95.9 F | HEART RATE: 69 BPM | HEIGHT: 63 IN

## 2021-08-26 DIAGNOSIS — Z79.4 TYPE 2 DIABETES MELLITUS WITH HYPERGLYCEMIA, WITH LONG-TERM CURRENT USE OF INSULIN (HCC): Primary | ICD-10-CM

## 2021-08-26 DIAGNOSIS — E11.65 TYPE 2 DIABETES MELLITUS WITH HYPERGLYCEMIA, WITH LONG-TERM CURRENT USE OF INSULIN (HCC): Primary | ICD-10-CM

## 2021-08-26 LAB — GLUCOSE POC: 121 MG/DL

## 2021-08-26 PROCEDURE — 95251 CONT GLUC MNTR ANALYSIS I&R: CPT | Performed by: INTERNAL MEDICINE

## 2021-08-26 PROCEDURE — 82962 GLUCOSE BLOOD TEST: CPT | Performed by: INTERNAL MEDICINE

## 2021-08-26 PROCEDURE — 99214 OFFICE O/P EST MOD 30 MIN: CPT | Performed by: INTERNAL MEDICINE

## 2021-08-26 PROCEDURE — 3052F HG A1C>EQUAL 8.0%<EQUAL 9.0%: CPT | Performed by: INTERNAL MEDICINE

## 2021-08-26 RX ORDER — INSULIN LISPRO 100 [IU]/ML
INJECTION, SOLUTION INTRAVENOUS; SUBCUTANEOUS
Qty: 30 ML | Refills: 5 | Status: SHIPPED | OUTPATIENT
Start: 2021-08-26 | End: 2021-11-23

## 2021-08-26 RX ORDER — SITAGLIPTIN 100 MG/1
TABLET, FILM COATED ORAL
COMMUNITY
Start: 2021-08-16 | End: 2021-08-26 | Stop reason: SDUPTHER

## 2021-08-26 RX ORDER — TERCONAZOLE 8 MG/G
CREAM VAGINAL
COMMUNITY
Start: 2021-08-13 | End: 2021-09-07

## 2021-08-26 RX ORDER — SITAGLIPTIN 100 MG/1
100 TABLET, FILM COATED ORAL DAILY
Qty: 90 TABLET | Refills: 1 | Status: SHIPPED | OUTPATIENT
Start: 2021-08-26 | End: 2021-11-09 | Stop reason: SDUPTHER

## 2021-08-26 RX ORDER — SUB-Q INSULIN DEVICE, 40 UNIT
EACH MISCELLANEOUS
Qty: 90 DEVICE | Refills: 1 | Status: SHIPPED | OUTPATIENT
Start: 2021-08-26 | End: 2022-09-16

## 2021-08-26 RX ORDER — INSULIN LISPRO 100 [IU]/ML
INJECTION, SOLUTION INTRAVENOUS; SUBCUTANEOUS
Qty: 30 ML | Refills: 5
Start: 2021-08-26 | End: 2021-08-26 | Stop reason: SDUPTHER

## 2021-08-26 RX ORDER — ATENOLOL 50 MG/1
50 TABLET ORAL 2 TIMES DAILY
COMMUNITY
Start: 2021-08-14 | End: 2022-09-16

## 2021-08-26 NOTE — LETTER
8/26/2021    Patient: Jordy Shultz   YOB: 1965   Date of Visit: 8/26/2021     [phone]  Via Fax: [phone]    Dear Brandi Andre MD,      Thank you for referring Ms. Unique Roach to [address] [place] [address] for evaluation. My notes for this consultation are attached. If you have questions, please do not hesitate to call me. I look forward to following your patient along with you.       Sincerely,    Grisel Sampson MD

## 2021-08-26 NOTE — PROGRESS NOTES
History and Physical    Patient: Edwin Watkins MRN: [id number]  SSN: [id number]    YOB: 1965  Age: 49 y.o. Sex: female      Subjective:     Edwin Watkins is a 49 y.o. female with a past mental history of hypertension, hyperlipidemia, O2 and steroid dependent COPD is here for follow up of type 2 diabetes mellitus. She is in primary care of Dr. Pauline aVlente. patient was diagnosed with diabetes in May 2019 during labs done as a part of annual physical.   Patient started using V-GO-40 in March 2021. She is doing 5 clicks before each meal.  At the last visit we added Basaglar 5 units every day because of fasting hyperglycemia. She is following this regimen. She is also on Januvia 100 mg daily. She is eating 3 meals per day, snacks at bedtime on sugar-free cookies or watermelon or strawberries. Denies any hypoglycemia. Continues to be on prednisone 5 mg daily for COPD. She has gained 3 more pounds since last visit. She was cleared by her cardiologist and pulmonologist to go ahead with bariatric surgery. She is seeing Dr. Sarah Santacruz. Plan is for gastric bypass surgery in December 2021. Glucometer reading: Freestyle saúl was downloaded and reviewed with patient. Please see procedure note below.     Updated diabetes history:  · Diagnosis: 1 month back (5/2019)    · Current treatment: V-GO 40 with 5 clicks before each meal, Januvia 100 mg daily, Basaglar 5 units daily    · Past treatment: metformin, metformin ER (nausea and stomach pain), glimepiride, Basaglar, Humalog    · Glucose checks: twice a day as above    · Hyperglycemia: no    · Hypoglycemia: no    · Meals per day: 3, breakfast: greek yogurt or oatmeal, lunch: jenny calenders/ burgers or sandwich, dinner: steak/chicken breast, vegetables, snacks: apples, orange, cantaloupe, popsicle, pretzels, chips, sugar free candy, regular soda rarely    · Exercise: walks at home on treadmill    · DM related hospitalizations: no        Complications of DM:    · CAD: no    · CVA: no    · PVD: no    · Amputations: no     · Retinopathy: no; last exam was 2020    · Gastropathy: no    · Nephropathy: no    · Neuropathy: no        Medications:    · Statin: Atorvastatin 80    · ACE-I: losartan    · ASA: no        · Diabetes education: no    Past Medical History:   Diagnosis Date    Body mass index 40.0-44.9, adult ([facility] 2020    Chronic obstructive lung disease ([facility] [facility] 2020    Chronic otitis externa 3/2/2020    Chronic otitis media 2020    Cyst of ovary 2020    Diabetes mellitus type 2, controlled ([facility] [address] [facility] 2020    Endometriosis 2020    Essential hypertension 2020    Hypercholesterolemia 2020    Ingrown toenail 2020    Long term (current) use of systemic steroids 2020    Mixed hyperlipidemia 2020    Morbid obesity ([facility] [facility] 2020    Obesity     Obstructive sleep apnea of adult 2020    Tetralogy of Fallot 2020    Uncontrolled type 2 diabetes mellitus ([facility] [address] 2020     Past Surgical History:   Procedure Laterality Date    HX BACK SURGERY      HX BREAST BIOPSY Left     benign    HX  SECTION      x2    HX HYSTERECTOMY      HX TONSILLECTOMY      HX TONSILLECTOMY      HX TUBAL LIGATION      AR CARDIAC SURG PROCEDURE UNLIST        Family History   Problem Relation Age of Onset    Diabetes Mother      Social History     Tobacco Use    Smoking status: Former Smoker     Years: 30.00    Smokeless tobacco: Never Used   Substance Use Topics    Alcohol use: Not Currently      Prior to Admission medications    Medication Sig Start Date End Date Taking? Authorizing Provider   terconazole (TERAZOL 3) 0.8 % vaginal cream insert 1 applicatorful vaginally once daily at bedtime for 3 days 21  Yes Provider, Historical   atenoloL (TENORMIN) 50 mg tablet Take 50 mg by mouth two (2) times a day.  21  Yes Provider, Historical   Januvia 100 mg tablet Take 1 Tablet by mouth daily for 90 days. 8/26/21 11/24/21 Yes Kell Oliveros MD   sub-q insulin device, 40 unit (V-GO 40) may Use as directed once a day, 90 days 8/26/21  Yes Kell Oliveros MD   insulin lispro (HumaLOG U-100 Insulin) 100 unit/mL injection inject as directed PER VGO. MAXIMUM DAILY UNITS 90 8/26/21  Yes Kell Oliveros MD   PARoxetine mesylate,menop.sym, (Brisdelle) 7.5 mg cap Take 1 Tablet by mouth daily. 8/4/21  Yes Jeet Chen MD   flash glucose sensor (FreeStyle Daivde 2 Sensor) kit Check glucose 4 times a day 7/9/21  Yes Kell Oliveros MD   atorvastatin (LIPITOR) 80 mg tablet Take 1 Tablet by mouth nightly for 90 days. 6/8/21 9/6/21 Yes Kell Oliveros MD   albuterol-ipratropium (DUO-NEB) 2.5 mg-0.5 mg/3 ml nebu inhale contents of 1 vial ( 3 milliliters ) in nebulizer by mouth. ..  (REFER TO PRESCRIPTION NOTES). 3/10/21  Yes Provider, Historical   clotrimazole-betamethasone (LOTRISONE) topical cream Apply to external ears twice daily x 1 week 3/29/21  Yes Nicole March MD   Syringe with Needle, Disp, 1 mL 25 gauge x 1\" syrg Once a day to fill V-GO 3/8/21  Yes Kell Oliveros MD   flash glucose scanning reader (FreeStyle [place] 2 [place]) misc Check glucose 4 times a day 3/5/21  Yes Kell Oliveros MD   insulin glargine (Basaglar KwikPen U-100 Insulin) 100 unit/mL (3 mL) inpn Inject 40 units daily 3/5/21  Yes Kell Oliveros MD   furosemide (LASIX) 40 mg tablet take 1 tablet by mouth twice a day if needed for EDEMA.  FOLLOW UP WITH DR Tarun Min 1/20/21  Yes Provider, Historical   glucose blood VI test strips (OneTouch Verio test strips) strip Use to check glucose twice a day 2/5/21  Yes Kell Oliveros MD   lancets (OneTouch Delica Plus Lancet) 33 gauge misc Use to check glucose twice a day 2/5/21  Yes Kell Oliveros MD   Insulin Needles, Disposable, (BD Ultra-Fine Short Pen Needle) 31 gauge x 5/16\" ndle Use to take insulin 4 times a day 12/28/20  Yes Kell Oliveros MD   Trelegy Ellipta 100-62.5-25 mcg inhaler inhale 1 puff by mouth and INTO THE LUNGS once daily Rinse mouth after use 11/13/20  Yes Provider, Historical   metoprolol succinate (TOPROL-XL) 100 mg tablet take 1 tablet by mouth once daily 10/30/20  Yes Provider, Historical   traMADoL (ULTRAM) 50 mg tablet take 1 tablet by mouth three times a day if needed for Breakthrough pain 9/15/20  Yes Provider, Historical   predniSONE (DELTASONE) 5 mg tablet Take  by mouth. Yes Provider, Historical   amLODIPine (NORVASC) 5 mg tablet Take 5 mg by mouth daily. Yes Provider, Historical   glycopyrrolate-formoteroL (Bevespi Aerosphere) 9-4.8 mcg HFA inhaler Take 2 Puffs by inhalation two (2) times a day. Yes Provider, Historical   cyclobenzaprine (FLEXERIL) 10 mg tablet Take  by mouth three (3) times daily as needed for Muscle Spasm(s). Yes Provider, Historical   losartan (COZAAR) 100 mg tablet Take 100 mg by mouth daily. Yes Provider, Historical   albuterol (ProAir HFA) 90 mcg/actuation inhaler Take  by inhalation. Yes Provider, Historical   zolpidem CR (AMBIEN CR) 12.5 mg tablet Take 12.5 mg by mouth nightly as needed for Sleep. Yes Provider, Historical   cholecalciferol, vitamin D3, (Vitamin D3) 50 mcg (2,000 unit) tab Take  by mouth. Yes Provider, Historical   nebivoloL (Bystolic) 5 mg tablet Take  by mouth daily. Yes Provider, Historical        No Known Allergies    Review of Systems:  ROS    A comprehensive review of systems was preformed and it is negative except mentioned in HPI    Objective:     Vitals:    08/26/21 1425 08/26/21 1435   BP: (!) 145/72 (!) 152/86   Pulse: 73 69   Temp: (!) 95.9 °F (35.5 °C)    TempSrc: Temporal    SpO2: 94%    Weight: 288 lb 8 oz (130.9 kg)    Height: 5' 3\" (1.6 m)         Physical Exam:    Physical Exam  Constitutional:       Appearance: She is obese. HENT:      Head: Normocephalic and atraumatic. Cardiovascular:      Rate and Rhythm: Normal rate and regular rhythm.    Pulmonary:      Effort: Pulmonary effort is normal. Breath sounds: Normal breath sounds. Neurological:      Mental Status: She is alert. Procedure note:  CGM download: Sushant Saavedra was downloaded from 8--8-  CGMS TRACING PATTERNS    Overnight Trends (the most consistent pattern): Euglycemia       prandial trends: Some postprandial hyperglycemia after dinner and then there is a very consistent hyperglycemia from around 10 PM to midnight    Additional Comments  See CGMS download and CGMS Patient Log in     CGMS RECOMMENDATIONS    Medication Changes: Continue current insulin regimen but discussed with patient about healthy snacking at bedtime    Labs and Imaging:    Results for Belkis Verdugo (MRN [id number]) as of 4/23/2021 12:51   Ref. Range 3/18/2021 10:25   Sodium Latest Ref Range: 134 - 144 mmol/L 143   Potassium Latest Ref Range: 3.5 - 5.2 mmol/L 4.2   Chloride Latest Ref Range: 96 - 106 mmol/L 100   CO2 Latest Ref Range: 20 - 29 mmol/L 28   Glucose Latest Ref Range: 65 - 99 mg/dL 153 (H)   BUN Latest Ref Range: 6 - 24 mg/dL 16   Creatinine Latest Ref Range: 0.57 - 1.00 mg/dL 0.72   BUN/Creatinine ratio Latest Ref Range: 9 - 23  22   Calcium Latest Ref Range: 8.7 - 10.2 mg/dL 9.8   GFR est non-AA Latest Ref Range: >59 mL/min/1.73 96   GFR est AA Latest Ref Range: >59 mL/min/1.73 111   Bilirubin, total Latest Ref Range: 0.0 - 1.2 mg/dL 0.7   Protein, total Latest Ref Range: 6.0 - 8.5 g/dL 7.3   Albumin Latest Ref Range: 3.8 - 4.9 g/dL 4.5   A-G Ratio Latest Ref Range: 1.2 - 2.2  1.6   ALT Latest Ref Range: 0 - 32 IU/L 23   AST Latest Ref Range: 0 - 40 IU/L 20   Alk. phosphatase Latest Ref Range: 39 - 117 IU/L 133 (H)   Results for Belkis Verdugo (MRN [id number]) as of 4/23/2021 12:51   Ref.  Range 3/18/2021 10:25   Triglyceride Latest Ref Range: 0 - 149 mg/dL 127   Cholesterol, total Latest Ref Range: 100 - 199 mg/dL 175   HDL Cholesterol Latest Ref Range: >39 mg/dL 51   VLDL, calculated Latest Ref Range: 5 - 40 mg/dL 23   LDL, calculated Latest Ref Range: 0 - 99 mg/dL 101 (H)   Results for Norman Hammans (MRN [id number]) as of 4/23/2021 12:51   Ref. Range 3/18/2021 10:25   Creatinine, urine Latest Ref Range: Not Estab. mg/dL 74.3   Microalbumin, urine Latest Ref Range: Not Estab. ug/mL 39.1   Microalbumin/Creat. Ratio Latest Ref Range: 0 - 29 mg/g creat 53 (H)   Results for Norman Hammans (MRN [id number]) as of 4/23/2021 12:51   Ref. Range 3/18/2021 10:25   TSH Latest Ref Range: 0.450 - 4.500 uIU/mL 1.740     Last 3 Recorded Weights in this Encounter    08/26/21 1425   Weight: 288 lb 8 oz (130.9 kg)        Lab Results   Component Value Date/Time    Hemoglobin A1c 8.9 (H) 09/09/2020 11:34 AM    Hemoglobin A1c, External 9.1 02/04/2020 12:00 AM        Assessment:     Patient Active Problem List   Diagnosis Code    Body mass index 40.0-44.9, adult ([address] Z68.41    Chronic obstructive lung disease ([address] J44.9    Essential hypertension I10    Hypercholesterolemia E78.00    Ingrown toenail L60.0    Long term (current) use of systemic steroids Z79.52    Mixed hyperlipidemia E78.2    Morbid obesity ([address] E66.01    Obstructive sleep apnea of adult G47.33    Diabetes mellitus type 2, controlled ([address] E11.9    Uncontrolled type 2 diabetes mellitus ([address] E11.65    Obesity E66.9    Chronic otitis media H66.90    Cyst of ovary N83.209    Endometriosis N80.9    Tetralogy of Fallot Q21.3    Type 2 diabetes mellitus with hyperglycemia, with long-term current use of insulin ([facility]) E11.65, Z79.4    Chronic otitis externa H60.60           Plan:     type II diabetes mellitus uncontrolled  Hemoglobin A1c was 9.5% on 5-, 6.4% on 8-, 9.1% on 2-4-2020, 8.9% on 9-9-2020, 9.7% in my office on 3-5-2021, 8.4% on 7-9-2021. Fingerstick blood glucose 121 mg/dL in my office today.     Up to date with diabetes related annual labs: March 2021    Up to date with diabetic eye exam: yes    plan:  Glucose control looks adequate except hyperglycemia at bedtime secondary to bedtime snack. Discussed with patient about restricting herself to either nuts or vegetables for bedtime snack. Continue V-Go 40 and Basaglar 5 units every day for total 45 units of basal insulin. Continue current clicks with V-Go prior to each meal.  I will see her back in 6 weeks. Continue Januvia 100 mg daily. not a good candidate for SGLT-2 inhibitor because of frequent UTI and vaginal yeast infections, not a good candidate for pioglitazone because of obesity, unable to tolerate metformin and metformin ER, not a candidate for GLP-1 agonist because she is on Januvia.    essential hypertension  Running normal at home. No microalbuminuria. Continue current medications. mixed hyperlipidemia  1-: LDL elevated at 128. Increased atorvastatin from 20 mg to 40 mg. LDL repeated in September 2020 was better but still elevated at 114. Increase atorvastatin to 80 mg daily at bedtime. March 2021 total cholesterol 175, triglycerides 127, . Continue atorvastatin 80 mg daily at bedtime. morbid obesity  diet, unable to exercise. Getting prepared for bariatric surgery. She has started seeing nutritionist already. Plan is for gastric bypass surgery in December 2021. body mass index 40+ - severely obese    ingrowing toenail  Has been taken care of by podiatrist.    long-term current use of systemic steroid  Patient knows that she should not stop prednisone abruptly because she is steroid-dependent. She will also need stress dose of intravenous steroids perioperatively when she gets gastric bypass surgery. Patient is aware about this. obstructive sleep apnea of adult  Unable to use CPAP because of sinus infection. Patient's pulmonologist is aware. Orders Placed This Encounter    AMB POC GLUCOSE BLOOD, BY GLUCOSE MONITORING DEVICE    [place] CONTINUOUS GLUCOSE MONITORING ANALYSIS I&R    Januvia 100 mg tablet     Sig: Take 1 Tablet by mouth daily for 90 days. Dispense:  90 Tablet     Refill:  1    DISCONTD: insulin lispro (HumaLOG U-100 Insulin) 100 unit/mL injection     Sig: inject as directed PER VGO. MAXIMUM DAILY UNITS 90     Dispense:  30 mL     Refill:  5    sub-q insulin device, 40 unit (V-GO 40) may     Sig: Use as directed once a day, 90 days     Dispense:  90 Device     Refill:  1    insulin lispro (HumaLOG U-100 Insulin) 100 unit/mL injection     Sig: inject as directed PER VGO.  MAXIMUM DAILY UNITS 90     Dispense:  30 mL     Refill:  5        Signed By: Justine Garcia MD     August 26, 2021      Return to clinic 6 weeks

## 2021-11-09 ENCOUNTER — OFFICE VISIT (OUTPATIENT)
Dept: ENDOCRINOLOGY | Age: 56
End: 2021-11-09
Payer: COMMERCIAL

## 2021-11-09 VITALS
SYSTOLIC BLOOD PRESSURE: 136 MMHG | DIASTOLIC BLOOD PRESSURE: 77 MMHG | HEART RATE: 65 BPM | BODY MASS INDEX: 50.71 KG/M2 | TEMPERATURE: 96.6 F | OXYGEN SATURATION: 97 % | HEIGHT: 63 IN | WEIGHT: 286.2 LBS

## 2021-11-09 DIAGNOSIS — E11.65 TYPE 2 DIABETES MELLITUS WITH HYPERGLYCEMIA, WITH LONG-TERM CURRENT USE OF INSULIN (HCC): Primary | ICD-10-CM

## 2021-11-09 DIAGNOSIS — B37.31 VAGINAL YEAST INFECTION: ICD-10-CM

## 2021-11-09 DIAGNOSIS — Z79.4 TYPE 2 DIABETES MELLITUS WITH HYPERGLYCEMIA, WITH LONG-TERM CURRENT USE OF INSULIN (HCC): Primary | ICD-10-CM

## 2021-11-09 LAB — GLUCOSE POC: 109 MG/DL

## 2021-11-09 PROCEDURE — 82962 GLUCOSE BLOOD TEST: CPT | Performed by: INTERNAL MEDICINE

## 2021-11-09 PROCEDURE — 3052F HG A1C>EQUAL 8.0%<EQUAL 9.0%: CPT | Performed by: INTERNAL MEDICINE

## 2021-11-09 PROCEDURE — 99214 OFFICE O/P EST MOD 30 MIN: CPT | Performed by: INTERNAL MEDICINE

## 2021-11-09 RX ORDER — FLUCONAZOLE 150 MG/1
TABLET ORAL
Qty: 2 TABLET | Refills: 0 | Status: SHIPPED | OUTPATIENT
Start: 2021-11-09 | End: 2022-09-16

## 2021-11-09 RX ORDER — FLASH GLUCOSE SENSOR
KIT MISCELLANEOUS
Qty: 2 KIT | Refills: 5 | Status: SHIPPED | OUTPATIENT
Start: 2021-11-09 | End: 2022-04-11 | Stop reason: SDUPTHER

## 2021-11-09 RX ORDER — SITAGLIPTIN 100 MG/1
100 TABLET, FILM COATED ORAL DAILY
Qty: 90 TABLET | Refills: 1 | Status: SHIPPED | OUTPATIENT
Start: 2021-11-09 | End: 2022-01-10

## 2021-11-09 NOTE — PROGRESS NOTES
History and Physical    Patient: Kavitha Sarmiento MRN: [id number]  SSN: [id number]    YOB: 1965  Age: 49 y.o. Sex: female      Subjective:     Kavitha Sarmiento is a 49 y.o. female with a past mental history of hypertension, hyperlipidemia, O2 and steroid dependent COPD is here for follow up of type 2 diabetes mellitus. She is in primary care of Dr. Robe Cleary. patient was diagnosed with diabetes in May 2019 during labs done as a part of annual physical.   Patient started using V-GO-40 in March 2021. She is doing 5 clicks before each meal, Basaglar 5 units every day because of fasting hyperglycemia. She is following this regimen. She is also on Januvia 100 mg daily. She is eating 3 meals per day. She has stopped snacking at bedtime. She has followed liver shrinkage diet but she continues to notice a spike in her blood glucose after dinner. Denies any hypoglycemia. She is going to get gastric bypass surgery by Dr. Kalina Heath, tentatively scheduled for December 7, 2021. Continues to be on prednisone 5 mg daily for COPD. Lost 2 pounds since last visit. She is having recurrent itching in vaginal area, which did not subside with fluconazole.     Glucometer reading: Nhan Tran was reviewed    Updated diabetes history:  · Diagnosis: 1 month back (5/2019)    · Current treatment: V-GO 40 with 5 clicks before each meal, Januvia 100 mg daily, Basaglar 5 units daily    · Past treatment: metformin, metformin ER (nausea and stomach pain), glimepiride, Basaglar, Humalog    · Glucose checks: twice a day as above    · Hyperglycemia: no    · Hypoglycemia: no    · Meals per day: 3, breakfast: greek yogurt or oatmeal, lunch: jenny calenders/ burgers or sandwich, dinner: steak/chicken breast, vegetables, snacks: apples, orange, cantaloupe, popsicle, pretzels, chips, sugar free candy, regular soda rarely    · Exercise: walks at home on treadmill    · DM related hospitalizations: no        Complications of DM:    · CAD: no    · CVA: no    · PVD: no    · Amputations: no     · Retinopathy: no; last exam was 2020    · Gastropathy: no    · Nephropathy: no    · Neuropathy: no        Medications:    · Statin: Atorvastatin 80    · ACE-I: losartan    · ASA: no        · Diabetes education: no    Past Medical History:   Diagnosis Date    Body mass index 40.0-44.9, adult ([address] 2020    Chronic obstructive lung disease ([address] 2020    Chronic otitis externa 3/2/2020    Chronic otitis media 2020    Cyst of ovary 2020    [facility]     Diabetes mellitus type 2, controlled ([facility] 2020    Ear problems     Endometriosis 2020    Essential hypertension 2020    High cholesterol     Hypercholesterolemia 2020    Hypertension     Ingrown toenail 2020    Long term (current) use of systemic steroids 2020    Mixed hyperlipidemia 2020    Morbid obesity ([address] 2020    Obesity     Obstructive sleep apnea of adult 2020    Tetralogy of Fallot 2020    Uncontrolled type 2 diabetes mellitus ([address] 2020     Past Surgical History:   Procedure Laterality Date    HX BACK SURGERY      HX BREAST BIOPSY Left     benign    HX  SECTION      x2    HX HYSTERECTOMY      HX TONSILLECTOMY      HX TONSILLECTOMY      HX TUBAL LIGATION      GA CARDIAC SURG PROCEDURE UNLIST        Family History   Problem Relation Age of Onset    Diabetes Mother      Social History     Tobacco Use    Smoking status: Former Smoker     Years: 30.00    Smokeless tobacco: Never Used   Substance Use Topics    Alcohol use: Not Currently      Prior to Admission medications    Medication Sig Start Date End Date Taking?  Authorizing Provider   fluconazole (DIFLUCAN) 150 mg tablet 1 tab today and 2nd tablet after 72 hours 21  Yes Jannette Rush MD   flash glucose sensor (FreeStyle Davide 2 Sensor) kit Check glucose 4 times a day 21  Yes MD Christa Mullins 100 mg tablet Take 1 Tablet by mouth daily for 90 days. 11/9/21 2/7/22 Yes Willian Murray MD   insulin glargine (Basaglar KwikPen U-100 Insulin) 100 unit/mL (3 mL) inpn INJECT 30UNITS ONCE DAILY 9/7/21  Yes Willian Murray MD   terconazole (TERAZOL 3) 0.8 % vaginal cream insert 1 applicatorful vaginally once daily at bedtime for 3 days 9/7/21  Yes Aly Yan MD   atenoloL (TENORMIN) 50 mg tablet Take 50 mg by mouth two (2) times a day. 8/14/21  Yes Provider, Historical   sub-q insulin device, 40 unit (V-GO 40) may Use as directed once a day, 90 days 8/26/21  Yes Willian Murray MD   insulin lispro (HumaLOG U-100 Insulin) 100 unit/mL injection inject as directed PER VGO. MAXIMUM DAILY UNITS 90 8/26/21  Yes Willian Murray MD   PARoxetine mesylate,menop.sym, (Brisdelle) 7.5 mg cap Take 1 Tablet by mouth daily. 8/4/21  Yes Aly Yan MD   albuterol-ipratropium (DUO-NEB) 2.5 mg-0.5 mg/3 ml nebu inhale contents of 1 vial ( 3 milliliters ) in nebulizer by mouth. ..  (REFER TO PRESCRIPTION NOTES). 3/10/21  Yes Provider, Historical   clotrimazole-betamethasone (LOTRISONE) topical cream Apply to external ears twice daily x 1 week 3/29/21  Yes Crystal Contreras MD   Syringe with Needle, Disp, 1 mL 25 gauge x 1\" syrg Once a day to fill V-GO 3/8/21  Yes Willian Murray MD   flash glucose scanning reader (FreeStyle Davide 2 [place]) misc Check glucose 4 times a day 3/5/21  Yes Willian Murray MD   furosemide (LASIX) 40 mg tablet take 1 tablet by mouth twice a day if needed for EDEMA.  FOLLOW UP WITH DR He Mcqueen 1/20/21  Yes Provider, Historical   glucose blood VI test strips (OneTouch Verio test strips) strip Use to check glucose twice a day 2/5/21  Yes Willian Murray MD   lancets (OneTouch Delica Plus Lancet) 33 gauge misc Use to check glucose twice a day 2/5/21  Yes Willian Murray MD   Insulin Needles, Disposable, (BD Ultra-Fine Short Pen Needle) 31 gauge x 5/16\" ndle Use to take insulin 4 times a day 12/28/20  Yes Willian Murray MD Trelegy Ellipta 100-62.5-25 mcg inhaler inhale 1 puff by mouth and INTO THE LUNGS once daily Rinse mouth after use 11/13/20  Yes Provider, Historical   metoprolol succinate (TOPROL-XL) 100 mg tablet take 1 tablet by mouth once daily 10/30/20  Yes Provider, Historical   traMADoL (ULTRAM) 50 mg tablet take 1 tablet by mouth three times a day if needed for Breakthrough pain 9/15/20  Yes Provider, Historical   predniSONE (DELTASONE) 5 mg tablet Take  by mouth. Yes Provider, Historical   albuterol sulfate (PROVENTIL;VENTOLIN) 2.5 mg/0.5 mL nebu nebulizer solution by Nebulization route once. Yes Provider, Historical   atenoloL (TENORMIN) 50 mg tablet Take  by mouth daily. Yes Provider, Historical   atorvastatin (LIPITOR) 40 mg tablet Take  by mouth daily. Yes Provider, Historical   insulin glargine (Basaglar KwikPen U-100 Insulin) 100 unit/mL (3 mL) inpn by SubCUTAneous route. Yes Provider, Historical   Insulin Needles, Disposable, (BD Ultra-Fine Short Pen Needle) 31 gauge x 5/16\" ndle by Does Not Apply route. Yes Provider, Historical   glycopyrrolate-formoteroL (Bevespi Aerosphere) 9-4.8 mcg HFA inhaler Take 2 Puffs by inhalation two (2) times a day. Yes Provider, Historical   ciprofloxacin-dexamethasone (Ciprodex) 0.3-0.1 % otic suspension Administer 4 Drops in left ear two (2) times a day. Yes Provider, Historical   ciprofloxacin HCl (CIPRO) 500 mg tablet Take  by mouth two (2) times a day. Yes Provider, Historical   cyclobenzaprine (FLEXERIL) 10 mg tablet Take  by mouth three (3) times daily as needed for Muscle Spasm(s). Yes Provider, Historical   DOXYCYCLINE MONOHYDRATE PO Take  by mouth. Yes Provider, Historical   fluticasone propionate (Flovent HFA) 110 mcg/actuation inhaler Take  by inhalation. Yes Provider, Historical   fluconazole (DIFLUCAN) 150 mg tablet Take 150 mg by mouth daily. FDA advises cautious prescribing of oral fluconazole in pregnancy.    Yes Provider, Historical furosemide (LASIX) 20 mg tablet Take  by mouth daily. Yes Provider, Historical   glimepiride (AMARYL) 2 mg tablet Take  by mouth every morning. Yes Provider, Historical   glipiZIDE (GLUCOTROL) 5 mg tablet Take  by mouth two (2) times a day. Yes Provider, Historical   albuterol-ipratropium (DUO-NEB) 2.5 mg-0.5 mg/3 ml nebu 3 mL by Nebulization route. Yes Provider, Historical   SITagliptin (Januvia) 100 mg tablet Take 100 mg by mouth daily. Yes Provider, Historical   losartan (COZAAR) 100 mg tablet Take 100 mg by mouth daily. Yes Provider, Historical   meloxicam (MOBIC) 7.5 mg tablet Take  by mouth daily. Yes Provider, Historical   metroNIDAZOLE (FLAGYL) 500 mg tablet Take  by mouth three (3) times daily. Yes Provider, Historical   lancets (OneTouch Delica Plus Lancet) 33 gauge misc by Does Not Apply route. Yes Provider, Historical   glucose blood VI test strips (OneTouch Verio test strips) strip by Does Not Apply route See Admin Instructions. Yes Provider, Historical   PARoxetine mesylate,menop.sym, 7.5 mg cap Take  by mouth. Yes Provider, Historical   pneumococcal 23-valent (Pneumovax-23) 25 mcg/0.5 mL injection 0.5 mL by IntraMUSCular route PRIOR TO DISCHARGE. Yes Provider, Historical   predniSONE (DELTASONE) 10 mg tablet Take  by mouth daily (with breakfast). Yes Provider, Historical   predniSONE (DELTASONE) 20 mg tablet Take  by mouth daily (with breakfast). Yes Provider, Historical   predniSONE (DELTASONE) 5 mg tablet Take  by mouth. Yes Provider, Historical   albuterol (ProAir HFA) 90 mcg/actuation inhaler Take  by inhalation. Yes Provider, Historical   promethazine-codeine (PHENERGAN with CODEINE) 6.25-10 mg/5 mL syrup Take  by mouth four (4) times daily as needed for Cough. Yes Provider, Historical   varicella-zoster recombinant, PF, (Shingrix, PF,) 50 mcg/0.5 mL susr injection 0.5 mL by IntraMUSCular route once.    Yes Provider, Historical   tobramycin-dexamethasone [facility]) ophthalmic suspension 1 Drop every four (4) hours (while awake). Yes Provider, Historical   zolpidem CR (AMBIEN CR) 12.5 mg tablet Take 12.5 mg by mouth nightly as needed for Sleep. Yes Provider, Historical   amLODIPine (NORVASC) 5 mg tablet Take 5 mg by mouth daily. Yes Provider, Historical   glycopyrrolate-formoteroL (Bevespi Aerosphere) 9-4.8 mcg HFA inhaler Take 2 Puffs by inhalation two (2) times a day. Yes Provider, Historical   cyclobenzaprine (FLEXERIL) 10 mg tablet Take  by mouth three (3) times daily as needed for Muscle Spasm(s). Yes Provider, Historical   losartan (COZAAR) 100 mg tablet Take 100 mg by mouth daily. Yes Provider, Historical   albuterol (ProAir HFA) 90 mcg/actuation inhaler Take  by inhalation. Yes Provider, Historical   zolpidem CR (AMBIEN CR) 12.5 mg tablet Take 12.5 mg by mouth nightly as needed for Sleep. Yes Provider, Historical   cholecalciferol, vitamin D3, (Vitamin D3) 50 mcg (2,000 unit) tab Take  by mouth. Yes Provider, Historical   nebivoloL (Bystolic) 5 mg tablet Take  by mouth daily. Yes Provider, Historical        No Known Allergies    Review of Systems:  ROS    A comprehensive review of systems was preformed and it is negative except mentioned in HPI    Objective:     Vitals:    11/09/21 1630   BP: 136/77   Pulse: 65   Temp: (!) 96.6 °F (35.9 °C)   TempSrc: Temporal   SpO2: 97%   Weight: 286 lb 3.2 oz (129.8 kg)   Height: 5' 3\" (1.6 m)        Physical Exam:    Physical Exam  Constitutional:       Appearance: She is obese. HENT:      Head: Normocephalic and atraumatic. Cardiovascular:      Rate and Rhythm: Normal rate and regular rhythm. Pulmonary:      Effort: Pulmonary effort is normal.      Breath sounds: Normal breath sounds. Neurological:      Mental Status: She is alert. Labs and Imaging:    Results for Carline Mccarthy (MRN [id number]) as of 4/23/2021 12:51   Ref.  Range 3/18/2021 10:25   Sodium Latest Ref Range: 134 - 144 mmol/L 143   Potassium Latest Ref Range: 3.5 - 5.2 mmol/L 4.2   Chloride Latest Ref Range: 96 - 106 mmol/L 100   CO2 Latest Ref Range: 20 - 29 mmol/L 28   Glucose Latest Ref Range: 65 - 99 mg/dL 153 (H)   BUN Latest Ref Range: 6 - 24 mg/dL 16   Creatinine Latest Ref Range: 0.57 - 1.00 mg/dL 0.72   BUN/Creatinine ratio Latest Ref Range: 9 - 23  22   Calcium Latest Ref Range: 8.7 - 10.2 mg/dL 9.8   GFR est non-AA Latest Ref Range: >59 mL/min/1.73 96   GFR est AA Latest Ref Range: >59 mL/min/1.73 111   Bilirubin, total Latest Ref Range: 0.0 - 1.2 mg/dL 0.7   Protein, total Latest Ref Range: 6.0 - 8.5 g/dL 7.3   Albumin Latest Ref Range: 3.8 - 4.9 g/dL 4.5   A-G Ratio Latest Ref Range: 1.2 - 2.2  1.6   ALT Latest Ref Range: 0 - 32 IU/L 23   AST Latest Ref Range: 0 - 40 IU/L 20   Alk. phosphatase Latest Ref Range: 39 - 117 IU/L 133 (H)   Results for Sheeba Batres (MRN [id number]) as of 4/23/2021 12:51   Ref. Range 3/18/2021 10:25   Triglyceride Latest Ref Range: 0 - 149 mg/dL 127   Cholesterol, total Latest Ref Range: 100 - 199 mg/dL 175   HDL Cholesterol Latest Ref Range: >39 mg/dL 51   VLDL, calculated Latest Ref Range: 5 - 40 mg/dL 23   LDL, calculated Latest Ref Range: 0 - 99 mg/dL 101 (H)   Results for Sheeba Batres (MRN [id number]) as of 4/23/2021 12:51   Ref. Range 3/18/2021 10:25   Creatinine, urine Latest Ref Range: Not Estab. mg/dL 74.3   Microalbumin, urine Latest Ref Range: Not Estab. ug/mL 39.1   Microalbumin/Creat. Ratio Latest Ref Range: 0 - 29 mg/g creat 53 (H)   Results for Sheeba Batres (MRN [id number]) as of 4/23/2021 12:51   Ref.  Range 3/18/2021 10:25   TSH Latest Ref Range: 0.450 - 4.500 uIU/mL 1.740     Last 3 Recorded Weights in this Encounter    11/09/21 1630   Weight: 286 lb 3.2 oz (129.8 kg)        Lab Results   Component Value Date/Time    Hemoglobin A1c 8.9 (H) 09/09/2020 11:34 AM    Hemoglobin A1c, External 9.1 02/04/2020 12:00 AM    Hemoglobin A1c, External 9.1 02/04/2020 12:00 AM Assessment:     Patient Active Problem List   Diagnosis Code    Body mass index 40.0-44.9, adult ([facility] Z68.41    Chronic obstructive lung disease ([facility] J44.9    Essential hypertension I10    Hypercholesterolemia E78.00    Ingrown toenail L60.0    Long term (current) use of systemic steroids Z79.52    Mixed hyperlipidemia E78.2    Morbid obesity ([facility] E66.01    Obstructive sleep apnea of adult G47.33    Diabetes mellitus type 2, controlled ([facility] E11.9    Uncontrolled type 2 diabetes mellitus ([address] E11.65    Obesity E66.9    Chronic otitis media H66.90    Cyst of ovary N83.209    Endometriosis N80.9    Tetralogy of Fallot Q21.3    Type 2 diabetes mellitus with hyperglycemia, with long-term current use of insulin ([facility]) E11.65, Z79.4    Chronic otitis externa H60.60    Body mass index 40.0-44.9, adult ([facility]) Z68.41    Chronic obstructive lung disease ([facility]) J44.9    Essential hypertension I10    Hypercholesterolemia E78.00    Ingrowing toenail L60.0    Long term (current) use of systemic steroids Z79.52    Mixed hyperlipidemia E78.2    Morbid obesity ([facility]) E66.01    Obstructive sleep apnea of adult G47.33    Type 2 diabetes mellitus ([facility]) E11.9    Chronic otitis media H66.90           Plan:     type II diabetes mellitus uncontrolled  Hemoglobin A1c was 9.5% on 5-, 6.4% on 8-, 9.1% on 2-4-2020, 8.9% on 9-9-2020, 9.7% in my office on 3-5-2021, 8.4% on 7-9-2021, 7.7% on 11-8-2021 at PCP office. Fingerstick blood glucose 109 mg/dL in my office today. Up to date with diabetes related annual labs: March 2021    Up to date with diabetic eye exam: yes    plan:  Glucose control looks adequate except hyperglycemia after dinner. Continue V-Go 40 and Basaglar 5 units every day for total 45 units of basal insulin. Continue 5 clicks before breakfast and lunch, increase to 6 clicks before dinner. Continue Januvia 100 mg daily. I will see her back in 2 months.   Okay to proceed with bariatric surgery from endocrine standpoint. not a good candidate for SGLT-2 inhibitor because of frequent UTI and vaginal yeast infections, not a good candidate for pioglitazone because of obesity, unable to tolerate metformin and metformin ER, not a candidate for GLP-1 agonist because she is on Januvia.    essential hypertension  Running normal at home. No microalbuminuria. Continue current medications. mixed hyperlipidemia  2020: LDL elevated at 128. Increased atorvastatin from 20 mg to 40 mg. LDL repeated in 2020 was better but still elevated at 114. Increase atorvastatin to 80 mg daily at bedtime. 2021 total cholesterol 175, triglycerides 127, . Continue atorvastatin 80 mg daily at bedtime. morbid obesity  Going to have gastric bypass surgery, tentatively scheduled for 2021. ingrowing toenail  Has been taken care of by podiatrist.    long-term current use of systemic steroid  Patient knows that she should not stop prednisone abruptly because she is steroid-dependent. She will also need stress dose of intravenous steroids perioperatively when she gets gastric bypass surgery. Patient is aware about this. obstructive sleep apnea of adult  Unable to use CPAP because of sinus infection. Patient's pulmonologist is aware. Vaginal yeast infection, recurrent  Fluconazole 150 mg once today and repeat after 72 hours    Orders Placed This Encounter    AMB POC GLUCOSE BLOOD, BY GLUCOSE MONITORING DEVICE    fluconazole (DIFLUCAN) 150 mg tablet     Si tab today and 2nd tablet after 72 hours     Dispense:  2 Tablet     Refill:  0    flash glucose sensor (FreeStyle Davide 2 Sensor) kit     Sig: Check glucose 4 times a day     Dispense:  2 Kit     Refill:  5    Januvia 100 mg tablet     Sig: Take 1 Tablet by mouth daily for 90 days.      Dispense:  90 Tablet     Refill:  1        Signed By: Tracy Moore MD     2021      Return to clinic 2 months

## 2021-11-09 NOTE — LETTER
11/9/2021    Patient: Meg Piper   YOB: 1965   Date of Visit: 11/9/2021     [phone]  Via Fax: [phone]    Dear Evita Reyes MD,      Thank you for referring Ms. Jessika Pinedo to [address] [place] [address] for evaluation. My notes for this consultation are attached. If you have questions, please do not hesitate to call me. I look forward to following your patient along with you.       Sincerely,    Hernesto Cisneros MD

## 2021-11-23 ENCOUNTER — TELEPHONE (OUTPATIENT)
Dept: ENDOCRINOLOGY | Age: 56
End: 2021-11-23

## 2021-11-23 DIAGNOSIS — E11.65 TYPE 2 DIABETES MELLITUS WITH HYPERGLYCEMIA, WITH LONG-TERM CURRENT USE OF INSULIN (HCC): Primary | ICD-10-CM

## 2021-11-23 DIAGNOSIS — Z79.4 TYPE 2 DIABETES MELLITUS WITH HYPERGLYCEMIA, WITH LONG-TERM CURRENT USE OF INSULIN (HCC): Primary | ICD-10-CM

## 2021-11-23 RX ORDER — INSULIN ASPART 100 [IU]/ML
INJECTION, SOLUTION INTRAVENOUS; SUBCUTANEOUS
Qty: 30 ML | Refills: 3 | Status: SHIPPED | OUTPATIENT
Start: 2021-11-23 | End: 2022-01-10

## 2021-11-23 NOTE — TELEPHONE ENCOUNTER
Patient is still having itching in vaginal area after 2 doses of fluconazole. Explained to patient that she needs to talk to her OB/GYN or PCP about this. She continues to have burning at the site of V-GO application. She is using Humalog in V-GO. Explained to patient that we need to switch it to NovoLog. Harini: I am sending prescription for NovoLog. Please check if her insurance needs prior authorization for this. Patient cannot be on Humalog because it is giving her skin reaction.

## 2021-11-23 NOTE — TELEPHONE ENCOUNTER
Patient is calling to inform she is still having problems with insulin and itching in her private area. Can you please give her a call?

## 2022-01-10 ENCOUNTER — TELEPHONE (OUTPATIENT)
Dept: ENDOCRINOLOGY | Age: 57
End: 2022-01-10

## 2022-01-10 ENCOUNTER — VIRTUAL VISIT (OUTPATIENT)
Dept: ENDOCRINOLOGY | Age: 57
End: 2022-01-10
Payer: COMMERCIAL

## 2022-01-10 DIAGNOSIS — E11.65 TYPE 2 DIABETES MELLITUS WITH HYPERGLYCEMIA, WITH LONG-TERM CURRENT USE OF INSULIN (HCC): Primary | ICD-10-CM

## 2022-01-10 DIAGNOSIS — E78.2 MIXED HYPERLIPIDEMIA: ICD-10-CM

## 2022-01-10 DIAGNOSIS — Z79.4 TYPE 2 DIABETES MELLITUS WITH HYPERGLYCEMIA, WITH LONG-TERM CURRENT USE OF INSULIN (HCC): Primary | ICD-10-CM

## 2022-01-10 DIAGNOSIS — E66.01 MORBID OBESITY (HCC): ICD-10-CM

## 2022-01-10 DIAGNOSIS — I10 BENIGN ESSENTIAL HTN: ICD-10-CM

## 2022-01-10 PROCEDURE — 99214 OFFICE O/P EST MOD 30 MIN: CPT | Performed by: INTERNAL MEDICINE

## 2022-01-10 RX ORDER — URSODIOL 250 MG/1
TABLET, FILM COATED ORAL
COMMUNITY
Start: 2021-11-24 | End: 2022-09-16

## 2022-01-10 RX ORDER — ONDANSETRON 4 MG/1
TABLET, ORALLY DISINTEGRATING ORAL
COMMUNITY
Start: 2021-12-01 | End: 2022-09-16

## 2022-01-10 RX ORDER — ATORVASTATIN CALCIUM 80 MG/1
TABLET, FILM COATED ORAL
COMMUNITY
Start: 2021-11-20

## 2022-01-10 RX ORDER — DOCUSATE SODIUM 100 MG
CAPSULE ORAL
COMMUNITY
Start: 2021-12-08 | End: 2022-09-16

## 2022-01-10 NOTE — PROGRESS NOTES
History and Physical    Patient: Jonathan Chavarria MRN: [id number]  SSN: [id number]    YOB: 1965  Age: 49 y.o. Sex: female      Subjective:   Jonathan Chavarria, was evaluated through a synchronous (real-time) audio-video encounter. The patient (or guardian if applicable) is aware that this is a billable service. Verbal consent to proceed has been obtained within the past 12 months. The visit was conducted pursuant to the emergency declaration under the [facility], [address] [place] [address] authority and the [employer] Act. Patient identification was verified, and a caregiver was present when appropriate. The patient was located in a state where the provider was credentialed to provide care. Jonathan Chavarria is a 49 y.o. female with a past mental history of hypertension, hyperlipidemia, O2 and steroid dependent COPD is here for follow up of type 2 diabetes mellitus. She is in primary care of Dr. Teresa Luna. patient was diagnosed with diabetes in May 2019 during labs done as a part of annual physical.   Patient started using V-GO-40 in March 2021. She is doing 5 clicks before each meal, Basaglar 5 units every day because of fasting hyperglycemia, Januvia 100 mg daily. Patient had gastric bypass surgery on 11- by Dr. Flavia Alas. Postoperative. Was uneventful. After surgery we stop Januvia and V-GO, patient was left only on Basaglar 10 units daily. Patient is able to tolerate solid foods. She has already lost more than 24 pounds since her surgery. Feeling well. Continues to use freestyle saúl. Readings are staying around 135 mg/dL or lower, except sometimes it goes higher, per patient. No hypoglycemia. Continues to be on prednisone 5 mg daily for COPD.     Glucometer reading: Not available    Updated diabetes history:  · Diagnosis: 1 month back (5/2019)    · Current treatment: V-GO 40 with 5 clicks before each meal, Januvia 100 mg daily, Basaglar 5 units daily    · Past treatment: metformin, metformin ER (nausea and stomach pain), glimepiride, Basaglar, Humalog    · Glucose checks: twice a day as above    · Hyperglycemia: no    · Hypoglycemia: no    · Meals per day: 3, breakfast: greek yogurt or oatmeal, lunch: jenny calenders/ burgers or sandwich, dinner: steak/chicken breast, vegetables, snacks: apples, orange, cantaloupe, popsicle, pretzels, chips, sugar free candy, regular soda rarely    · Exercise: walks at home on treadmill    · DM related hospitalizations: no        Complications of DM:    · CAD: no    · CVA: no    · PVD: no    · Amputations: no     · Retinopathy: no; last exam was 2020    · Gastropathy: no    · Nephropathy: no    · Neuropathy: no        Medications:    · Statin: Atorvastatin 80    · ACE-I: losartan    · ASA: no        · Diabetes education: no    Past Medical History:   Diagnosis Date    Body mass index 40.0-44.9, adult ([facility] 2020    Chronic obstructive lung disease ([facility] 2020    Chronic otitis externa 3/2/2020    Chronic otitis media 2020    Cyst of ovary 2020    Diabetes ([facility]     Diabetes mellitus type 2, controlled ([facility] 2020    Ear problems     Endometriosis 2020    Essential hypertension 2020    High cholesterol     Hypercholesterolemia 2020    Hypertension     Ingrown toenail 2020    Long term (current) use of systemic steroids 2020    Mixed hyperlipidemia 2020    Morbid obesity ([facility] 2020    Obesity     Obstructive sleep apnea of adult 2020    Tetralogy of Fallot 2020    Uncontrolled type 2 diabetes mellitus ([address] 2020     Past Surgical History:   Procedure Laterality Date    HX BACK SURGERY      HX BREAST BIOPSY Left     benign    HX  SECTION      x2    HX GASTRIC BYPASS  2021    HX HYSTERECTOMY      HX TONSILLECTOMY      HX TONSILLECTOMY      HX TUBAL LIGATION      LA CARDIAC SURG PROCEDURE UNLIST        Family History   Problem Relation Age of Onset    Diabetes Mother      Social History     Tobacco Use    Smoking status: Former Smoker     Years: 30.00    Smokeless tobacco: Never Used   Substance Use Topics    Alcohol use: Not Currently      Prior to Admission medications    Medication Sig Start Date End Date Taking? Authorizing Provider   Stool Softener 100 mg capsule  12/8/21  Yes Provider, Historical   ursodioL (ACTIGALL) 250 mg tablet  11/24/21  Yes Provider, Historical   ondansetron (ZOFRAN ODT) 4 mg disintegrating tablet  12/1/21  Yes Provider, Historical   atorvastatin (LIPITOR) 80 mg tablet  11/20/21  Yes Provider, Historical   flash glucose sensor (FreeStyle Davide 2 Sensor) kit Check glucose 4 times a day 11/9/21  Yes Umu Quezada MD   terconazole (TERAZOL 3) 0.8 % vaginal cream insert 1 applicatorful vaginally once daily at bedtime for 3 days 9/7/21  Yes Cory Cummings MD   atenoloL (TENORMIN) 50 mg tablet Take 50 mg by mouth two (2) times a day. 8/14/21  Yes Provider, Historical   sub-q insulin device, 40 unit (V-GO 40) may Use as directed once a day, 90 days 8/26/21  Yes Umu Quezada MD   PARoxetine mesylate,menop.sym, (Brisdelle) 7.5 mg cap Take 1 Tablet by mouth daily. 8/4/21  Yes Cory Cummings MD   albuterol-ipratropium (DUO-NEB) 2.5 mg-0.5 mg/3 ml nebu inhale contents of 1 vial ( 3 milliliters ) in nebulizer by mouth. ..  (REFER TO PRESCRIPTION NOTES). 3/10/21  Yes Provider, Historical   clotrimazole-betamethasone (LOTRISONE) topical cream Apply to external ears twice daily x 1 week 3/29/21  Yes Kayla Lowe MD   Syringe with Needle, Disp, 1 mL 25 gauge x 1\" syrg Once a day to fill V-GO 3/8/21  Yes Umu Quezada MD   flash glucose scanning reader (FreeStyle Davide 2 [place]) misc Check glucose 4 times a day 3/5/21  Yes Umu Quezada MD   furosemide (LASIX) 40 mg tablet take 1 tablet by mouth twice a day if needed for EDEMA.  FOLLOW UP WITH DR Rox López 1/20/21  Yes Provider, Historical   glucose blood VI test strips (OneTouch Verio test strips) strip Use to check glucose twice a day 2/5/21  Yes Kristopher Briseno MD   lancets (OneTouch Delica Plus Lancet) 33 gauge misc Use to check glucose twice a day 2/5/21  Yes Kristopher Briseno MD   Insulin Needles, Disposable, (BD Ultra-Fine Short Pen Needle) 31 gauge x 5/16\" ndle Use to take insulin 4 times a day 12/28/20  Yes MD Abe Rodriguez Ellipta 100-62.5-25 mcg inhaler inhale 1 puff by mouth and INTO THE LUNGS once daily Rinse mouth after use 11/13/20  Yes Provider, Historical   metoprolol succinate (TOPROL-XL) 100 mg tablet take 1 tablet by mouth once daily 10/30/20  Yes Provider, Historical   traMADoL (ULTRAM) 50 mg tablet take 1 tablet by mouth three times a day if needed for Breakthrough pain 9/15/20  Yes Provider, Historical   predniSONE (DELTASONE) 5 mg tablet Take  by mouth. Yes Provider, Historical   atenoloL (TENORMIN) 50 mg tablet Take  by mouth daily. Yes Provider, Historical   insulin glargine (Basaglar KwikPen U-100 Insulin) 100 unit/mL (3 mL) inpn by SubCUTAneous route. Yes Provider, Historical   Insulin Needles, Disposable, (BD Ultra-Fine Short Pen Needle) 31 gauge x 5/16\" ndle by Does Not Apply route. Yes Provider, Historical   glycopyrrolate-formoteroL (Bevespi Aerosphere) 9-4.8 mcg HFA inhaler Take 2 Puffs by inhalation two (2) times a day. Yes Provider, Historical   ciprofloxacin-dexamethasone (Ciprodex) 0.3-0.1 % otic suspension Administer 4 Drops in left ear two (2) times a day. Yes Provider, Historical   ciprofloxacin HCl (CIPRO) 500 mg tablet Take  by mouth two (2) times a day. Yes Provider, Historical   cyclobenzaprine (FLEXERIL) 10 mg tablet Take  by mouth three (3) times daily as needed for Muscle Spasm(s). Yes Provider, Historical   DOXYCYCLINE MONOHYDRATE PO Take  by mouth.    Yes Provider, Historical   albuterol-ipratropium (DUO-NEB) 2.5 mg-0.5 mg/3 ml nebu 3 mL by Nebulization route. Yes Provider, Historical   losartan (COZAAR) 100 mg tablet Take 100 mg by mouth daily. Yes Provider, Historical   meloxicam (MOBIC) 7.5 mg tablet Take  by mouth daily. Yes Provider, Historical   lancets (OneTouch Delica Plus Lancet) 33 gauge misc by Does Not Apply route. Yes Provider, Historical   glucose blood VI test strips (OneTouch Verio test strips) strip by Does Not Apply route See Admin Instructions. Yes Provider, Historical   PARoxetine mesylate,menop.sym, 7.5 mg cap Take  by mouth. Yes Provider, Historical   pneumococcal 23-valent (Pneumovax-23) 25 mcg/0.5 mL injection 0.5 mL by IntraMUSCular route PRIOR TO DISCHARGE. Yes Provider, Historical   varicella-zoster recombinant, PF, (Shingrix, PF,) 50 mcg/0.5 mL susr injection 0.5 mL by IntraMUSCular route once. Yes Provider, Historical   tobramycin-dexamethasone (TOBRADEX) ophthalmic suspension 1 Drop every four (4) hours (while awake). Yes Provider, Historical   amLODIPine (NORVASC) 5 mg tablet Take 5 mg by mouth daily. Yes Provider, Historical   glycopyrrolate-formoteroL (Bevespi Aerosphere) 9-4.8 mcg HFA inhaler Take 2 Puffs by inhalation two (2) times a day. Yes Provider, Historical   losartan (COZAAR) 100 mg tablet Take 100 mg by mouth daily. Yes Provider, Historical   albuterol (ProAir HFA) 90 mcg/actuation inhaler Take  by inhalation. Yes Provider, Historical   zolpidem CR (AMBIEN CR) 12.5 mg tablet Take 12.5 mg by mouth nightly as needed for Sleep. Yes Provider, Historical   cholecalciferol, vitamin D3, (Vitamin D3) 50 mcg (2,000 unit) tab Take  by mouth. Yes Provider, Historical   nebivoloL (Bystolic) 5 mg tablet Take  by mouth daily.    Yes Provider, Historical   fluconazole (DIFLUCAN) 150 mg tablet 1 tab today and 2nd tablet after 72 hours  Patient not taking: Reported on 1/10/2022 11/9/21   Harriett Saba MD   fluticasone propionate (Flovent HFA) 110 mcg/actuation inhaler Take  by inhalation. Patient not taking: Reported on 1/10/2022    Provider, Historical   fluconazole (DIFLUCAN) 150 mg tablet Take 150 mg by mouth daily. FDA advises cautious prescribing of oral fluconazole in pregnancy. Patient not taking: Reported on 1/10/2022    Provider, Historical   metroNIDAZOLE (FLAGYL) 500 mg tablet Take  by mouth three (3) times daily. Patient not taking: Reported on 1/10/2022    Provider, Historical        No Known Allergies    Review of Systems:  ROS    A comprehensive review of systems was preformed and it is negative except mentioned in HPI    Objective: There were no vitals filed for this visit. Physical Exam:    Physical Exam  Constitutional:       Appearance: Normal appearance. HENT:      Head: Normocephalic and atraumatic. Neurological:      Mental Status: She is alert. Labs and Imaging:    Results for Roderick Almendarez (MRN [id number]) as of 4/23/2021 12:51   Ref. Range 3/18/2021 10:25   Sodium Latest Ref Range: 134 - 144 mmol/L 143   Potassium Latest Ref Range: 3.5 - 5.2 mmol/L 4.2   Chloride Latest Ref Range: 96 - 106 mmol/L 100   CO2 Latest Ref Range: 20 - 29 mmol/L 28   Glucose Latest Ref Range: 65 - 99 mg/dL 153 (H)   BUN Latest Ref Range: 6 - 24 mg/dL 16   Creatinine Latest Ref Range: 0.57 - 1.00 mg/dL 0.72   BUN/Creatinine ratio Latest Ref Range: 9 - 23  22   Calcium Latest Ref Range: 8.7 - 10.2 mg/dL 9.8   GFR est non-AA Latest Ref Range: >59 mL/min/1.73 96   GFR est AA Latest Ref Range: >59 mL/min/1.73 111   Bilirubin, total Latest Ref Range: 0.0 - 1.2 mg/dL 0.7   Protein, total Latest Ref Range: 6.0 - 8.5 g/dL 7.3   Albumin Latest Ref Range: 3.8 - 4.9 g/dL 4.5   A-G Ratio Latest Ref Range: 1.2 - 2.2  1.6   ALT Latest Ref Range: 0 - 32 IU/L 23   AST Latest Ref Range: 0 - 40 IU/L 20   Alk. phosphatase Latest Ref Range: 39 - 117 IU/L 133 (H)   Results for Roderick Almendarez (MRN [id number]) as of 4/23/2021 12:51   Ref.  Range 3/18/2021 10:25   Triglyceride Latest Ref Range: 0 - 149 mg/dL 127   Cholesterol, total Latest Ref Range: 100 - 199 mg/dL 175   HDL Cholesterol Latest Ref Range: >39 mg/dL 51   VLDL, calculated Latest Ref Range: 5 - 40 mg/dL 23   LDL, calculated Latest Ref Range: 0 - 99 mg/dL 101 (H)   Results for Nino Ardon (MRN [id number]) as of 4/23/2021 12:51   Ref. Range 3/18/2021 10:25   Creatinine, urine Latest Ref Range: Not Estab. mg/dL 74.3   Microalbumin, urine Latest Ref Range: Not Estab. ug/mL 39.1   Microalbumin/Creat. Ratio Latest Ref Range: 0 - 29 mg/g creat 53 (H)   Results for Nino Ardon (MRN [id number]) as of 4/23/2021 12:51   Ref. Range 3/18/2021 10:25   TSH Latest Ref Range: 0.450 - 4.500 uIU/mL 1.740     There were no vitals filed for this visit.      Lab Results   Component Value Date/Time    Hemoglobin A1c 8.9 (H) 09/09/2020 11:34 AM    Hemoglobin A1c, External 9.1 02/04/2020 12:00 AM    Hemoglobin A1c, External 9.1 02/04/2020 12:00 AM        Assessment:     Patient Active Problem List   Diagnosis Code    Body mass index 40.0-44.9, adult ([facility] Z68.41    Chronic obstructive lung disease ([facility] J44.9    Essential hypertension I10    Hypercholesterolemia E78.00    Ingrown toenail L60.0    Long term (current) use of systemic steroids Z79.52    Mixed hyperlipidemia E78.2    Morbid obesity ([facility] E66.01    Obstructive sleep apnea of adult G47.33    Diabetes mellitus type 2, controlled ([facility] E11.9    Uncontrolled type 2 diabetes mellitus ([facility] E11.65    Obesity E66.9    Chronic otitis media H66.90    Cyst of ovary N83.209    Endometriosis N80.9    Tetralogy of Fallot Q21.3    Type 2 diabetes mellitus with hyperglycemia, with long-term current use of insulin ([facility]) E11.65, Z79.4    Chronic otitis externa H60.60    Body mass index 40.0-44.9, adult (HCC) Z68.41    Chronic obstructive lung disease (HCC) J44.9    Essential hypertension I10    Hypercholesterolemia E78.00    Ingrowing toenail L60.0    Long term (current) use of systemic steroids Z79.52    Mixed hyperlipidemia E78.2    Morbid obesity ([address] E66.01    Obstructive sleep apnea of adult G47.33    Type 2 diabetes mellitus (HCC) E11.9    Chronic otitis media H66.90           Plan:     type II diabetes mellitus uncontrolled  Hemoglobin A1c was 9.5% on 2522087, 6.4% on 8-, 9.1% on 2-4-2020, 8.9% on 9-9-2020, 9.7% in my office on 3-5-2021, 8.4% on 7-9-2021, 7.7% on 11-8-2021 at PCP office, 7.2% on 1-5-2022. Fingerstick blood glucose 109 mg/dL in my office today. Up to date with diabetes related annual labs: March 2021    Up to date with diabetic eye exam: yes    plan:  Encourage patient to work on diet. Continue Basaglar 10 units daily. Restart Januvia 100 mg daily. Advised patient to let me know if fasting readings are staying above 120, in which case we need to go up on Basaglar. It is possible that if she continues to lose weight and work on her diet, she may be able to get off of her medications. Patient will let me know when she needs refill on Basaglar and Januvia, she has enough at home. not a good candidate for SGLT-2 inhibitor because of frequent UTI and vaginal yeast infections, not a good candidate for pioglitazone because of obesity, unable to tolerate metformin and metformin ER, not a candidate for GLP-1 agonist because she is on Januvia.    essential hypertension  Running normal at home. No microalbuminuria, last checked in March 2021. Continue current medications. mixed hyperlipidemia  1-: LDL elevated at 128. Increased atorvastatin from 20 mg to 40 mg. LDL repeated in September 2020 was better but still elevated at 114. Increase atorvastatin to 80 mg daily at bedtime. March 2021 total cholesterol 175, triglycerides 127, .  1-5-2021: Total cholesterol 150, triglycerides 162, LDL 77. Continue atorvastatin 80 mg daily at bedtime. morbid obesity  S/p gastric bypass surgery on 11-.     ingrowing toenail  Has been taken care of by podiatrist.    long-term current use of systemic steroid  Patient knows that she should not stop prednisone abruptly because she is steroid-dependent. obstructive sleep apnea of adult  Unable to use CPAP because of sinus infection. Patient's pulmonologist is aware. No orders of the defined types were placed in this encounter.        Signed By: Jaime Garcia MD     January 10, 2022      Return to clinic 3 months

## 2022-01-10 NOTE — TELEPHONE ENCOUNTER
Patient canceled her appointment because she has been around someone that tested positive for COVID and she hasn't had a COVID test yet. She wanted an e-mail address to sent her lab results that she had done at her PCP. I informed her that we couldn't give out our e-mail and now she wants you to call her so she can talk to you about her results.

## 2022-03-07 ENCOUNTER — TELEPHONE (OUTPATIENT)
Dept: ENDOCRINOLOGY | Age: 57
End: 2022-03-07

## 2022-03-07 NOTE — TELEPHONE ENCOUNTER
Patient called in stated that her sugars yesterday dropped down to 55 twice yesterday. She didn't take her insulin because her blood sugar was low.  She wants to know what else she can do to bring her sugar up and keep it up

## 2022-03-08 NOTE — TELEPHONE ENCOUNTER
Spoke to patient. Advised her to stop all Basaglar and continue Januvia 100 mg daily. Advised her to call me back if she continues to have low sugar readings after 2 to 3 days of stopping Basaglar. Patient expressed understanding.

## 2022-03-18 PROBLEM — E78.2 MIXED HYPERLIPIDEMIA: Status: ACTIVE | Noted: 2020-11-16

## 2022-03-18 PROBLEM — E78.00 HYPERCHOLESTEROLEMIA: Status: ACTIVE | Noted: 2020-08-26

## 2022-03-18 PROBLEM — L60.0 INGROWING TOENAIL: Status: ACTIVE | Noted: 2020-11-16

## 2022-03-18 PROBLEM — L60.0 INGROWN TOENAIL: Status: ACTIVE | Noted: 2020-08-26

## 2022-03-18 PROBLEM — H60.60 CHRONIC OTITIS EXTERNA: Status: ACTIVE | Noted: 2020-03-02

## 2022-03-18 PROBLEM — E78.2 MIXED HYPERLIPIDEMIA: Status: ACTIVE | Noted: 2020-08-26

## 2022-03-18 PROBLEM — G47.33 OBSTRUCTIVE SLEEP APNEA OF ADULT: Status: ACTIVE | Noted: 2020-11-16

## 2022-03-18 PROBLEM — Z79.52 LONG TERM (CURRENT) USE OF SYSTEMIC STEROIDS: Status: ACTIVE | Noted: 2020-08-26

## 2022-03-19 PROBLEM — E78.00 HYPERCHOLESTEROLEMIA: Status: ACTIVE | Noted: 2020-11-16

## 2022-03-19 PROBLEM — Q21.3 TETRALOGY OF FALLOT: Status: ACTIVE | Noted: 2020-08-26

## 2022-03-19 PROBLEM — E11.65 TYPE 2 DIABETES MELLITUS WITH HYPERGLYCEMIA, WITH LONG-TERM CURRENT USE OF INSULIN (HCC): Status: ACTIVE | Noted: 2021-02-05

## 2022-03-19 PROBLEM — Z79.4 TYPE 2 DIABETES MELLITUS WITH HYPERGLYCEMIA, WITH LONG-TERM CURRENT USE OF INSULIN (HCC): Status: ACTIVE | Noted: 2021-02-05

## 2022-03-19 PROBLEM — E11.9 TYPE 2 DIABETES MELLITUS (HCC): Status: ACTIVE | Noted: 2020-11-16

## 2022-03-19 PROBLEM — H66.90 CHRONIC OTITIS MEDIA: Status: ACTIVE | Noted: 2020-11-16

## 2022-03-19 PROBLEM — N80.9 ENDOMETRIOSIS: Status: ACTIVE | Noted: 2020-08-26

## 2022-03-19 PROBLEM — G47.33 OBSTRUCTIVE SLEEP APNEA OF ADULT: Status: ACTIVE | Noted: 2020-08-26

## 2022-03-19 PROBLEM — J44.9 CHRONIC OBSTRUCTIVE LUNG DISEASE (HCC): Status: ACTIVE | Noted: 2020-11-16

## 2022-03-19 PROBLEM — Z79.52 LONG TERM (CURRENT) USE OF SYSTEMIC STEROIDS: Status: ACTIVE | Noted: 2020-11-16

## 2022-03-19 PROBLEM — H66.90 CHRONIC OTITIS MEDIA: Status: ACTIVE | Noted: 2020-08-26

## 2022-03-19 PROBLEM — J44.9 CHRONIC OBSTRUCTIVE LUNG DISEASE (HCC): Status: ACTIVE | Noted: 2020-08-26

## 2022-03-19 PROBLEM — E66.01 MORBID OBESITY (HCC): Status: ACTIVE | Noted: 2020-11-16

## 2022-03-19 PROBLEM — N83.209 CYST OF OVARY: Status: ACTIVE | Noted: 2020-08-26

## 2022-03-19 PROBLEM — I10 ESSENTIAL HYPERTENSION: Status: ACTIVE | Noted: 2020-08-26

## 2022-03-19 PROBLEM — E66.01 MORBID OBESITY (HCC): Status: ACTIVE | Noted: 2020-08-26

## 2022-03-20 PROBLEM — E11.9 DIABETES MELLITUS TYPE 2, CONTROLLED (HCC): Status: ACTIVE | Noted: 2020-08-26

## 2022-03-20 PROBLEM — I10 ESSENTIAL HYPERTENSION: Status: ACTIVE | Noted: 2020-11-16

## 2022-04-11 ENCOUNTER — OFFICE VISIT (OUTPATIENT)
Dept: ENDOCRINOLOGY | Age: 57
End: 2022-04-11
Payer: COMMERCIAL

## 2022-04-11 VITALS
OXYGEN SATURATION: 97 % | WEIGHT: 235.9 LBS | TEMPERATURE: 97.7 F | HEIGHT: 64 IN | RESPIRATION RATE: 17 BRPM | DIASTOLIC BLOOD PRESSURE: 66 MMHG | BODY MASS INDEX: 40.27 KG/M2 | SYSTOLIC BLOOD PRESSURE: 100 MMHG | HEART RATE: 68 BPM

## 2022-04-11 DIAGNOSIS — E11.65 TYPE 2 DIABETES MELLITUS WITH HYPERGLYCEMIA, WITH LONG-TERM CURRENT USE OF INSULIN (HCC): Primary | ICD-10-CM

## 2022-04-11 DIAGNOSIS — E66.01 MORBID OBESITY (HCC): ICD-10-CM

## 2022-04-11 DIAGNOSIS — I10 BENIGN ESSENTIAL HTN: ICD-10-CM

## 2022-04-11 DIAGNOSIS — E78.2 MIXED HYPERLIPIDEMIA: ICD-10-CM

## 2022-04-11 DIAGNOSIS — Z79.4 TYPE 2 DIABETES MELLITUS WITH HYPERGLYCEMIA, WITH LONG-TERM CURRENT USE OF INSULIN (HCC): Primary | ICD-10-CM

## 2022-04-11 LAB
GLUCOSE POC: 123 MG/DL
HBA1C MFR BLD HPLC: 6.8 %

## 2022-04-11 PROCEDURE — 99214 OFFICE O/P EST MOD 30 MIN: CPT | Performed by: INTERNAL MEDICINE

## 2022-04-11 PROCEDURE — 82962 GLUCOSE BLOOD TEST: CPT | Performed by: INTERNAL MEDICINE

## 2022-04-11 PROCEDURE — 3044F HG A1C LEVEL LT 7.0%: CPT | Performed by: INTERNAL MEDICINE

## 2022-04-11 PROCEDURE — 83036 HEMOGLOBIN GLYCOSYLATED A1C: CPT | Performed by: INTERNAL MEDICINE

## 2022-04-11 RX ORDER — FLASH GLUCOSE SENSOR
KIT MISCELLANEOUS
Qty: 2 KIT | Refills: 5 | Status: SHIPPED | OUTPATIENT
Start: 2022-04-11 | End: 2022-05-12

## 2022-04-11 NOTE — PROGRESS NOTES
History and Physical    Patient: Luis Núñez MRN: [id number]  SSN: [id number]    YOB: 1965  Age: 56 y.o. Sex: female      Subjective:     Luis Núñez is a 56 y.o. female with a past mental history of hypertension, hyperlipidemia, O2 and steroid dependent COPD is here for follow up of type 2 diabetes mellitus. She is in primary care of Dr. Yajaira Gaviria. patient was diagnosed with diabetes in May 2019 during labs done as a part of annual physical.     Patient had gastric bypass surgery on 11- by Dr. Ralf Craig. Since then she has lost more than 50 pounds. She is doing well. At the last visit we continued Basaglar 10 units daily and restart Januvia 100 mg daily. She was having some random hypoglycemia, readings in the 50s. So we stopped all Basaglar but to continue Januvia 100 mg daily. Since then she had only 1 or 2 episodes of hypoglycemia, in the 60s, she felt symptomatic when that happened. Patient was advised to talk to dietitian at bariatric center to see what dietary modifications she needs to do. She has appointment coming up in June. She is taking her vitamins regularly. She is going to get comprehensive lab evaluation thousand and 22 when she will go to see Dr. Ralf Craig. Continues to use freestyle saúl. Wants to get off of Januvia. Continues to take atorvastatin regularly. Overdue for diabetic eye exam.  Continues to be on prednisone 5 mg daily for COPD. Glucometer reading: Freestyle saúl could not be downloaded in the office because of technical issues. I reviewed on her cell phone.     Updated diabetes history:  · Diagnosis: 1 month back (5/2019)    · Current treatment: Januvia 100 mg daily    · Past treatment: metformin, metformin ER (nausea and stomach pain), glimepiride, Basaglar, Humalog, V-Go    · Glucose checks: twice a day as above    · Hyperglycemia: no    · Hypoglycemia: no    · Meals per day: 3, breakfast: greek yogurt or oatmeal, lunch: [employer] calenders/ burgers or sandwich, dinner: steak/chicken breast, vegetables, snacks: apples, orange, cantaloupe, popsicle, pretzels, chips, sugar free candy, regular soda rarely    · Exercise: walks at home on treadmill    · DM related hospitalizations: no        Complications of DM:    · CAD: no    · CVA: no    · PVD: no    · Amputations: no     · Retinopathy: no; last exam was 2020    · Gastropathy: no    · Nephropathy: no    · Neuropathy: no        Medications:    · Statin: Atorvastatin 80    · ACE-I: losartan    · ASA: no        · Diabetes education: no    Past Medical History:   Diagnosis Date    Body mass index 40.0-44.9, adult ([facility] 2020    Chronic obstructive lung disease ([facility] 2020    Chronic otitis externa 3/2/2020    Chronic otitis media 2020    Cyst of ovary 2020    Diabetes ([facility]     Diabetes mellitus type 2, controlled ([facility] 2020    Ear problems     Endometriosis 2020    Essential hypertension 2020    High cholesterol     Hypercholesterolemia 2020    Hypertension     Ingrown toenail 2020    Long term (current) use of systemic steroids 2020    Mixed hyperlipidemia 2020    Morbid obesity ([facility] 2020    Obesity     Obstructive sleep apnea of adult 2020    Tetralogy of Fallot 2020    Uncontrolled type 2 diabetes mellitus 2020     Past Surgical History:   Procedure Laterality Date    HX BACK SURGERY      HX BREAST BIOPSY Left     benign    HX  SECTION      x2    HX GASTRIC BYPASS  2021    HX HYSTERECTOMY      HX TONSILLECTOMY      HX TONSILLECTOMY      HX TUBAL LIGATION      VT CARDIAC SURG PROCEDURE UNLIST        Family History   Problem Relation Age of Onset    Diabetes Mother      Social History     Tobacco Use    Smoking status: Former Smoker     Years: 30.00    Smokeless tobacco: Never Used   Substance Use Topics    Alcohol use: Not Currently      Prior to Admission medications    Medication Sig Start Date End Date Taking? Authorizing Provider   flash glucose sensor (FreeStyle Davide 2 Sensor) kit Check glucose 4 times a day 4/11/22  Yes Leelee Najera MD   ursodioL (ACTIGALL) 250 mg tablet  11/24/21  Yes Provider, Historical   atorvastatin (LIPITOR) 80 mg tablet  11/20/21  Yes Provider, Historical   terconazole (TERAZOL 3) 0.8 % vaginal cream insert 1 applicatorful vaginally once daily at bedtime for 3 days 9/7/21  Yes Jennifer Degroot MD   atenoloL (TENORMIN) 50 mg tablet Take 50 mg by mouth two (2) times a day. 8/14/21  Yes Provider, Historical   sub-q insulin device, 40 unit (V-GO 40) may Use as directed once a day, 90 days 8/26/21  Yes Leelee Najera MD   PARoxetine mesylate,menop.sym, (Brisdelle) 7.5 mg cap Take 1 Tablet by mouth daily. 8/4/21  Yes Jennifer Degroot MD   albuterol-ipratropium (DUO-NEB) 2.5 mg-0.5 mg/3 ml nebu inhale contents of 1 vial ( 3 milliliters ) in nebulizer by mouth. ..  (REFER TO PRESCRIPTION NOTES). 3/10/21  Yes Provider, Historical   clotrimazole-betamethasone (LOTRISONE) topical cream Apply to external ears twice daily x 1 week 3/29/21  Yes Andrew Hernandez MD   Syringe with Needle, Disp, 1 mL 25 gauge x 1\" syrg Once a day to fill V-GO 3/8/21  Yes Leelee Najera MD   flash glucose scanning reader (FreeStyle Davide 2 [place]) misc Check glucose 4 times a day 3/5/21  Yes Leelee Najera MD   furosemide (LASIX) 40 mg tablet take 1 tablet by mouth twice a day if needed for EDEMA.  FOLLOW UP WITH DR Ana Mirza 1/20/21  Yes Provider, Historical   glucose blood VI test strips (OneTouch Verio test strips) strip Use to check glucose twice a day 2/5/21  Yes Leelee Najera MD   lancets (OneTouch Delica Plus Lancet) 33 gauge misc Use to check glucose twice a day 2/5/21  Yes Leelee Najera MD   Insulin Needles, Disposable, (BD Ultra-Fine Short Pen Needle) 31 gauge x 5/16\" ndle Use to take insulin 4 times a day 12/28/20  Yes Leelee Najera MD   [facility] 100-62.5-25 mcg inhaler inhale 1 puff by mouth and INTO THE LUNGS once daily Rinse mouth after use 11/13/20  Yes Provider, Historical   metoprolol succinate (TOPROL-XL) 100 mg tablet take 1 tablet by mouth once daily 10/30/20  Yes Provider, Historical   traMADoL (ULTRAM) 50 mg tablet take 1 tablet by mouth three times a day if needed for Breakthrough pain 9/15/20  Yes Provider, Historical   predniSONE (DELTASONE) 5 mg tablet Take  by mouth. Yes Provider, Historical   atenoloL (TENORMIN) 50 mg tablet Take  by mouth daily. Yes Provider, Historical   insulin glargine (Basaglar KwikPen U-100 Insulin) 100 unit/mL (3 mL) inpn by SubCUTAneous route. Yes Provider, Historical   Insulin Needles, Disposable, (BD Ultra-Fine Short Pen Needle) 31 gauge x 5/16\" ndle by Does Not Apply route. Yes Provider, Historical   glycopyrrolate-formoteroL (Bevespi Aerosphere) 9-4.8 mcg HFA inhaler Take 2 Puffs by inhalation two (2) times a day. Yes Provider, Historical   ciprofloxacin-dexamethasone (Ciprodex) 0.3-0.1 % otic suspension Administer 4 Drops in left ear two (2) times a day. Yes Provider, Historical   ciprofloxacin HCl (CIPRO) 500 mg tablet Take  by mouth two (2) times a day. Yes Provider, Historical   cyclobenzaprine (FLEXERIL) 10 mg tablet Take  by mouth three (3) times daily as needed for Muscle Spasm(s). Yes Provider, Historical   DOXYCYCLINE MONOHYDRATE PO Take  by mouth. Yes Provider, Historical   albuterol-ipratropium (DUO-NEB) 2.5 mg-0.5 mg/3 ml nebu 3 mL by Nebulization route. Yes Provider, Historical   losartan (COZAAR) 100 mg tablet Take 100 mg by mouth daily. Yes Provider, Historical   meloxicam (MOBIC) 7.5 mg tablet Take  by mouth daily. Yes Provider, Historical   lancets (OneTouch Delica Plus Lancet) 33 gauge misc by Does Not Apply route. Yes Provider, Historical   glucose blood VI test strips (OneTouch Verio test strips) strip by Does Not Apply route See Admin Instructions.    Yes Provider, Historical PARoxetine mesylate,menop.sym, 7.5 mg cap Take  by mouth. Yes Provider, Historical   pneumococcal 23-valent (Pneumovax-23) 25 mcg/0.5 mL injection 0.5 mL by IntraMUSCular route PRIOR TO DISCHARGE. Yes Provider, Historical   varicella-zoster recombinant, PF, (Shingrix, PF,) 50 mcg/0.5 mL susr injection 0.5 mL by IntraMUSCular route once. Yes Provider, Historical   tobramycin-dexamethasone (TOBRADEX) ophthalmic suspension 1 Drop every four (4) hours (while awake). Yes Provider, Historical   amLODIPine (NORVASC) 5 mg tablet Take 5 mg by mouth daily. Yes Provider, Historical   glycopyrrolate-formoteroL (Bevespi Aerosphere) 9-4.8 mcg HFA inhaler Take 2 Puffs by inhalation two (2) times a day. Yes Provider, Historical   losartan (COZAAR) 100 mg tablet Take 100 mg by mouth daily. Yes Provider, Historical   albuterol (ProAir HFA) 90 mcg/actuation inhaler Take  by inhalation. Yes Provider, Historical   zolpidem CR (AMBIEN CR) 12.5 mg tablet Take 12.5 mg by mouth nightly as needed for Sleep. Yes Provider, Historical   cholecalciferol, vitamin D3, (Vitamin D3) 50 mcg (2,000 unit) tab Take  by mouth. Yes Provider, Historical   nebivoloL (Bystolic) 5 mg tablet Take  by mouth daily. Yes Provider, Historical   Stool Softener 100 mg capsule  12/8/21   Provider, Historical   ondansetron (ZOFRAN ODT) 4 mg disintegrating tablet  12/1/21   Provider, Historical   fluconazole (DIFLUCAN) 150 mg tablet 1 tab today and 2nd tablet after 72 hours  Patient not taking: Reported on 1/10/2022 11/9/21   Uri Solitario MD   fluticasone propionate (Flovent HFA) 110 mcg/actuation inhaler Take  by inhalation. Patient not taking: Reported on 1/10/2022    Provider, Historical   fluconazole (DIFLUCAN) 150 mg tablet Take 150 mg by mouth daily. FDA advises cautious prescribing of oral fluconazole in pregnancy.   Patient not taking: Reported on 1/10/2022    Provider, Historical   metroNIDAZOLE (FLAGYL) 500 mg tablet Take  by mouth three (3) times daily. Patient not taking: Reported on 1/10/2022    Provider, Historical        No Known Allergies    Review of Systems:  ROS    A comprehensive review of systems was preformed and it is negative except mentioned in HPI    Objective:     Vitals:    04/11/22 1101   BP: 100/66   Pulse: 68   Resp: 17   Temp: 97.7 °F (36.5 °C)   TempSrc: Oral   SpO2: 97%   Weight: 235 lb 14.4 oz (107 kg)   Height: 5' 3.5\" (1.613 m)        Physical Exam:    Physical Exam  Constitutional:       Appearance: Normal appearance. HENT:      Head: Normocephalic and atraumatic. Cardiovascular:      Rate and Rhythm: Normal rate and regular rhythm. Pulmonary:      Effort: Pulmonary effort is normal.      Breath sounds: Normal breath sounds. Neurological:      Mental Status: She is alert. Labs and Imaging:    Results for Latesha Dunlap (MRN [id number]) as of 4/23/2021 12:51   Ref. Range 3/18/2021 10:25   Sodium Latest Ref Range: 134 - 144 mmol/L 143   Potassium Latest Ref Range: 3.5 - 5.2 mmol/L 4.2   Chloride Latest Ref Range: 96 - 106 mmol/L 100   CO2 Latest Ref Range: 20 - 29 mmol/L 28   Glucose Latest Ref Range: 65 - 99 mg/dL 153 (H)   BUN Latest Ref Range: 6 - 24 mg/dL 16   Creatinine Latest Ref Range: 0.57 - 1.00 mg/dL 0.72   BUN/Creatinine ratio Latest Ref Range: 9 - 23  22   Calcium Latest Ref Range: 8.7 - 10.2 mg/dL 9.8   GFR est non-AA Latest Ref Range: >59 mL/min/1.73 96   GFR est AA Latest Ref Range: >59 mL/min/1.73 111   Bilirubin, total Latest Ref Range: 0.0 - 1.2 mg/dL 0.7   Protein, total Latest Ref Range: 6.0 - 8.5 g/dL 7.3   Albumin Latest Ref Range: 3.8 - 4.9 g/dL 4.5   A-G Ratio Latest Ref Range: 1.2 - 2.2  1.6   ALT Latest Ref Range: 0 - 32 IU/L 23   AST Latest Ref Range: 0 - 40 IU/L 20   Alk. phosphatase Latest Ref Range: 39 - 117 IU/L 133 (H)   Results for Latesha Dunlap (MRN [id number]) as of 4/23/2021 12:51   Ref.  Range 3/18/2021 10:25   Triglyceride Latest Ref Range: 0 - 149 mg/dL 127 Cholesterol, total Latest Ref Range: 100 - 199 mg/dL 175   HDL Cholesterol Latest Ref Range: >39 mg/dL 51   VLDL, calculated Latest Ref Range: 5 - 40 mg/dL 23   LDL, calculated Latest Ref Range: 0 - 99 mg/dL 101 (H)   Results for Sarah Beth Agee (MRN [id number]) as of 4/23/2021 12:51   Ref. Range 3/18/2021 10:25   Creatinine, urine Latest Ref Range: Not Estab. mg/dL 74.3   Microalbumin, urine Latest Ref Range: Not Estab. ug/mL 39.1   Microalbumin/Creat. Ratio Latest Ref Range: 0 - 29 mg/g creat 53 (H)   Results for Sarah Beth Agee (MRN [id number]) as of 4/23/2021 12:51   Ref.  Range 3/18/2021 10:25   TSH Latest Ref Range: 0.450 - 4.500 uIU/mL 1.740     Last 3 Recorded Weights in this Encounter    04/11/22 1101   Weight: 235 lb 14.4 oz (107 kg)        Lab Results   Component Value Date/Time    Hemoglobin A1c 8.9 (H) 09/09/2020 11:34 AM    Hemoglobin A1c, External 9.1 02/04/2020 12:00 AM    Hemoglobin A1c, External 9.1 02/04/2020 12:00 AM        Assessment:     Patient Active Problem List   Diagnosis Code    Body mass index 40.0-44.9, adult ([facility] Z68.41    Chronic obstructive lung disease ([facility] J44.9    Essential hypertension I10    Hypercholesterolemia E78.00    Ingrown toenail L60.0    Long term (current) use of systemic steroids Z79.52    Mixed hyperlipidemia E78.2    Morbid obesity ([facility] E66.01    Obstructive sleep apnea of adult G47.33    Diabetes mellitus type 2, controlled ([facility] E11.9    Uncontrolled type 2 diabetes mellitus [id number]    Obesity E66.9    Chronic otitis media H66.90    Cyst of ovary N83.209    Endometriosis N80.9    Tetralogy of Fallot Q21.3    Type 2 diabetes mellitus with hyperglycemia, with long-term current use of insulin ([facility]) E11.65, Z79.4    Chronic otitis externa H60.60    Body mass index 40.0-44.9, adult (HCC) Z68.41    Chronic obstructive lung disease (HCC) J44.9    Essential hypertension I10    Hypercholesterolemia E78.00    Ingrowing toenail L60.0    Long term (current) use of systemic steroids Z79.52    Mixed hyperlipidemia E78.2    Morbid obesity ([facility] E66.01    Obstructive sleep apnea of adult G47.33    Type 2 diabetes mellitus (HCC) E11.9    Chronic otitis media H66.90           Plan:     type II diabetes mellitus uncontrolled  Hemoglobin A1c was 9.5% on 2734189, 6.4% on 8-, 9.1% on 2-4-2020, 8.9% on 9-9-2020, 9.7% in my office on 3-5-2021, 8.4% on 7-9-2021, 7.7% on 11-8-2021 at PCP office, 7.2% on 1-5-2022,   6.8% today. Fingerstick blood glucose 123 mg/dL in my office today. Up to date with diabetes related annual labs: March 2021    Up to date with diabetic eye exam: yes    plan:  Encourage patient to work on diet. Advised patient to talk to dietitian at bariatric center if she has more hypoglycemia. Stop all Januvia as she is still losing weight. Continue to use freestyle saúl and I will see her back in 3 months. I will follow up on the labs that are going to be done by Dr. Nader Rubio in June 2022. not a good candidate for SGLT-2 inhibitor because of frequent UTI and vaginal yeast infections, not a good candidate for pioglitazone because of obesity, unable to tolerate metformin and metformin ER, not a candidate for GLP-1 agonist because she is on Januvia.    essential hypertension  Blood pressure well controlled on current medications. No microalbuminuria, last checked in March 2021. Continue current medications. mixed hyperlipidemia  1-: LDL elevated at 128. Increased atorvastatin from 20 mg to 40 mg. LDL repeated in September 2020 was better but still elevated at 114. Increase atorvastatin to 80 mg daily at bedtime. March 2021 total cholesterol 175, triglycerides 127, .  1-5-2021: Total cholesterol 150, triglycerides 162, LDL 77. Continue atorvastatin 80 mg daily at bedtime. Check lipid profile prior to next appointment. morbid obesity  S/p gastric bypass surgery on 11-.   Lost more than 50 pounds. ingrowing toenail  Has been taken care of by podiatrist.    long-term current use of systemic steroid  Patient knows that she should not stop prednisone abruptly because she is steroid-dependent. obstructive sleep apnea of adult  Unable to use CPAP because of sinus infection. Patient's pulmonologist is aware.     Orders Placed This Encounter    AMB POC GLUCOSE BLOOD, BY GLUCOSE MONITORING DEVICE    AMB POC HEMOGLOBIN A1C    flash glucose sensor (FreeStyle Davide 2 Sensor) kit     Sig: Check glucose 4 times a day     Dispense:  2 Kit     Refill:  5        Signed By: Warren Martínez MD     April 11, 2022      Return to clinic 3 months

## 2022-04-11 NOTE — LETTER
4/11/2022    Patient: Cole Gomez   YOB: 1965   Date of Visit: 4/11/2022     [phone]  Via Fax: [phone]    Dear Erick Ojeda MD,      Thank you for referring Ms. Maria Del Rosario Pichardo to [address] [place] [address] for evaluation. My notes for this consultation are attached. If you have questions, please do not hesitate to call me. I look forward to following your patient along with you.       Sincerely,    Juan Francis MD

## 2022-04-11 NOTE — PROGRESS NOTES
1. \"Have you been to the ER, urgent care clinic since your last visit? Hospitalized since your last visit? \" No    2. \"Have you seen or consulted any other health care providers outside of the [address] [place] [address] since your last visit? \" No     3. For patients aged 39-70: Has the patient had a colonoscopy / FIT/ Cologuard? Yes - Care Gap present. Most recent result on file      If the patient is female:    4. For patients aged 41-77: Has the patient had a mammogram within the past 2 years? Yes - Care Gap present. Most recent result on file      5. For patients aged 21-65: Has the patient had a pap smear?  Yes - no Care Gap present

## 2022-05-16 ENCOUNTER — TELEPHONE (OUTPATIENT)
Dept: ENDOCRINOLOGY | Age: 57
End: 2022-05-16

## 2022-05-16 DIAGNOSIS — E11.21 CONTROLLED TYPE 2 DIABETES MELLITUS WITH DIABETIC NEPHROPATHY, WITHOUT LONG-TERM CURRENT USE OF INSULIN (HCC): Primary | ICD-10-CM

## 2022-05-16 NOTE — TELEPHONE ENCOUNTER
Patient called in stated that you had taken her off of the [place] and now her blood sugars are running high and she is afraid that her blood sugar and her A1C will continue to go up. She wants to talk to you about possibly going back on it.

## 2022-05-17 RX ORDER — DULAGLUTIDE 0.75 MG/.5ML
0.75 INJECTION, SOLUTION SUBCUTANEOUS
Qty: 2 ML | Refills: 3 | Status: SHIPPED | OUTPATIENT
Start: 2022-05-17 | End: 2022-07-11 | Stop reason: SDUPTHER

## 2022-05-17 NOTE — TELEPHONE ENCOUNTER
Spoke to patient. Sometimes blood glucose is going in the 200s. She has been off of Januvia since the last appointment. Discussed with patient that GLP-1 agonist will be better choice at this point instead of going back on Januvia, because it will help with reactive hypoglycemia as well. Patient is willing to try. I am going to send Trulicity prescription to her pharmacy. Discussed common side effects and how to avoid it.

## 2022-07-11 ENCOUNTER — OFFICE VISIT (OUTPATIENT)
Dept: ENDOCRINOLOGY | Age: 57
End: 2022-07-11
Payer: MEDICARE

## 2022-07-11 VITALS
TEMPERATURE: 96.9 F | BODY MASS INDEX: 38.95 KG/M2 | HEIGHT: 63 IN | HEART RATE: 63 BPM | SYSTOLIC BLOOD PRESSURE: 106 MMHG | WEIGHT: 219.8 LBS | DIASTOLIC BLOOD PRESSURE: 70 MMHG | OXYGEN SATURATION: 98 % | RESPIRATION RATE: 20 BRPM

## 2022-07-11 DIAGNOSIS — E11.21 CONTROLLED TYPE 2 DIABETES MELLITUS WITH DIABETIC NEPHROPATHY, WITHOUT LONG-TERM CURRENT USE OF INSULIN (HCC): ICD-10-CM

## 2022-07-11 DIAGNOSIS — I10 BENIGN ESSENTIAL HTN: ICD-10-CM

## 2022-07-11 DIAGNOSIS — E11.65 TYPE 2 DIABETES MELLITUS WITH HYPERGLYCEMIA, WITH LONG-TERM CURRENT USE OF INSULIN (HCC): Primary | ICD-10-CM

## 2022-07-11 DIAGNOSIS — E78.2 MIXED HYPERLIPIDEMIA: ICD-10-CM

## 2022-07-11 DIAGNOSIS — Z79.4 TYPE 2 DIABETES MELLITUS WITH HYPERGLYCEMIA, WITH LONG-TERM CURRENT USE OF INSULIN (HCC): Primary | ICD-10-CM

## 2022-07-11 LAB — HBA1C MFR BLD HPLC: 6.4 %

## 2022-07-11 PROCEDURE — 99214 OFFICE O/P EST MOD 30 MIN: CPT | Performed by: INTERNAL MEDICINE

## 2022-07-11 PROCEDURE — 3044F HG A1C LEVEL LT 7.0%: CPT | Performed by: INTERNAL MEDICINE

## 2022-07-11 PROCEDURE — G8752 SYS BP LESS 140: HCPCS | Performed by: INTERNAL MEDICINE

## 2022-07-11 PROCEDURE — G8417 CALC BMI ABV UP PARAM F/U: HCPCS | Performed by: INTERNAL MEDICINE

## 2022-07-11 PROCEDURE — G8754 DIAS BP LESS 90: HCPCS | Performed by: INTERNAL MEDICINE

## 2022-07-11 PROCEDURE — 83036 HEMOGLOBIN GLYCOSYLATED A1C: CPT | Performed by: INTERNAL MEDICINE

## 2022-07-11 PROCEDURE — 3017F COLORECTAL CA SCREEN DOC REV: CPT | Performed by: INTERNAL MEDICINE

## 2022-07-11 PROCEDURE — G8427 DOCREV CUR MEDS BY ELIG CLIN: HCPCS | Performed by: INTERNAL MEDICINE

## 2022-07-11 PROCEDURE — 2022F DILAT RTA XM EVC RTNOPTHY: CPT | Performed by: INTERNAL MEDICINE

## 2022-07-11 PROCEDURE — G8510 SCR DEP NEG, NO PLAN REQD: HCPCS | Performed by: INTERNAL MEDICINE

## 2022-07-11 PROCEDURE — G9899 SCRN MAM PERF RSLTS DOC: HCPCS | Performed by: INTERNAL MEDICINE

## 2022-07-11 RX ORDER — BLOOD SUGAR DIAGNOSTIC
STRIP MISCELLANEOUS
Qty: 50 STRIP | Refills: 5 | Status: SHIPPED | OUTPATIENT
Start: 2022-07-11

## 2022-07-11 RX ORDER — BLOOD-GLUCOSE METER
EACH MISCELLANEOUS
Qty: 1 EACH | Refills: 0 | Status: SHIPPED | OUTPATIENT
Start: 2022-07-11

## 2022-07-11 RX ORDER — LANCETS 33 GAUGE
EACH MISCELLANEOUS
Qty: 50 EACH | Refills: 5 | Status: SHIPPED | OUTPATIENT
Start: 2022-07-11

## 2022-07-11 RX ORDER — DULAGLUTIDE 0.75 MG/.5ML
0.75 INJECTION, SOLUTION SUBCUTANEOUS
COMMUNITY

## 2022-07-11 RX ORDER — DULAGLUTIDE 0.75 MG/.5ML
0.75 INJECTION, SOLUTION SUBCUTANEOUS
Qty: 2 ML | Refills: 5 | Status: SHIPPED | OUTPATIENT
Start: 2022-07-11 | End: 2022-08-10

## 2022-07-11 NOTE — PROGRESS NOTES
1. Have you been to the ER, urgent care clinic since your last visit? Hospitalized since your last visit? No    2. Have you seen or consulted any other health care providers outside of the [address] [place] [address] since your last visit? Include any pap smears or colon screening.  No     Visit Vitals  /70 (BP 1 Location: Right arm)   Pulse 63   Temp 96.9 °F (36.1 °C) (Temporal)   Resp 20   Ht 5' 3\" (1.6 m)   Wt 219 lb 12.8 oz (99.7 kg)   SpO2 98%   BMI 38.94 kg/m²       Chief Complaint   Patient presents with    High Blood Sugar

## 2022-07-11 NOTE — PROGRESS NOTES
History and Physical    Patient: Rubi Herman MRN: [id number]  SSN: [id number]    YOB: 1965  Age: 56 y.o. Sex: female      Subjective:     Rubi Herman is a 56 y.o. female with a past mental history of hypertension, hyperlipidemia, O2 and steroid dependent COPD is here for follow up of type 2 diabetes mellitus. She is in primary care of Dr. Oral Tafoya. patient was diagnosed with diabetes in May 2019 during labs done as a part of annual physical.     Patient had gastric bypass surgery on 11- by Dr. Rio Tong. Since then she has lost more than 70 pounds. She is doing well. At the last visit I had stopped all Januvia. However patient started to have some spikes in blood glucose. So she was started on Trulicity 2.36 mg weekly. She was having some reactive hypoglycemia as well which has improved a lot. Lowest glucose readings have been in the 70s. She has not had any more spike in blood glucose since starting Trulicity. She is due for diabetic eye exam.  Needs to call and make appointment. Continues to be on prednisone 5 mg daily for COPD.     Glucometer reading: Not available    Updated diabetes history:  · Diagnosis: 1 month back (5/2019)    · Current treatment: Trulicity 1.10 mg weekly    · Past treatment: metformin, metformin ER (nausea and stomach pain), glimepiride, Basaglar, Humalog, V-Go, Januvia    · Glucose checks: twice a day as above    · Hyperglycemia: no    · Hypoglycemia: no    · Meals per day: 3, breakfast: greek yogurt or oatmeal, lunch: jenny calenders/ burgers or sandwich, dinner: steak/chicken breast, vegetables, snacks: apples, orange, cantaloupe, popsicle, pretzels, chips, sugar free candy, regular soda rarely    · Exercise: walks at home on treadmill    · DM related hospitalizations: no        Complications of DM:    · CAD: no    · CVA: no    · PVD: no    · Amputations: no     · Retinopathy: no; last exam was February 2020    · Gastropathy: no    · Nephropathy: no    · Neuropathy: no        Medications:    · Statin: Atorvastatin 80    · ACE-I: losartan    · ASA: no        · Diabetes education: no    Past Medical History:   Diagnosis Date    Body mass index 40.0-44.9, adult ([facility] 2020    Chronic obstructive lung disease ([facility] 2020    Chronic otitis externa 3/2/2020    Chronic otitis media 2020    Cyst of ovary 2020    Diabetes ([facility]     Diabetes mellitus type 2, controlled ([facility] 2020    Ear problems     Endometriosis 2020    Essential hypertension 2020    High cholesterol     Hypercholesterolemia 2020    Hypertension     Ingrown toenail 2020    Long term (current) use of systemic steroids 2020    Mixed hyperlipidemia 2020    Morbid obesity ([facility] 2020    Obesity     Obstructive sleep apnea of adult 2020    Tetralogy of Fallot 2020    Uncontrolled type 2 diabetes mellitus 2020     Past Surgical History:   Procedure Laterality Date    HX BACK SURGERY      HX BREAST BIOPSY Left     benign    HX  SECTION      x2    HX GASTRIC BYPASS  2021    HX HYSTERECTOMY      HX TONSILLECTOMY      HX TONSILLECTOMY      HX TUBAL LIGATION      NC CARDIAC SURG PROCEDURE UNLIST        Family History   Problem Relation Age of Onset    Diabetes Mother      Social History     Tobacco Use    Smoking status: Former Smoker     Years: 30.00    Smokeless tobacco: Never Used   Substance Use Topics    Alcohol use: Not Currently      Prior to Admission medications    Medication Sig Start Date End Date Taking? Authorizing Provider   dulaglutide (Trulicity) 3.25 YN/6.3 mL sub-q pen 0.75 mg by SubCUTAneous route every seven (7) days. Yes Provider, Historical   dulaglutide (Trulicity) 8.89 FX/0.8 mL sub-q pen 0.5 mL by SubCUTAneous route every seven (7) days for 30 days.  7/11/22 8/10/22 Yes Gayathri Prakash MD   Blood-Glucose Meter (OneTouch Verio Meter) misc Check glucose once a day 7/11/22  Yes Rob Magdaleno MD   glucose blood VI test strips (OneTouch Verio test strips) strip Check glucose once a day 7/11/22  Yes Rob Magdaleno MD   lancets (One Touch Delica) 33 gauge misc Check glucose once a day 7/11/22  Yes Rob Magdaleno MD   FreeStyle Davide 2 Sensor kit CHECK GLUCOSE 4 TIMES A DAY 5/12/22  Yes Rob Magdaleno MD   atorvastatin (LIPITOR) 80 mg tablet  11/20/21  Yes Provider, Historical   PARoxetine mesylate,menop.sym, (Brisdelle) 7.5 mg cap Take 1 Tablet by mouth daily. 8/4/21  Yes Tanesha Sequeira MD   albuterol-ipratropium (DUO-NEB) 2.5 mg-0.5 mg/3 ml nebu inhale contents of 1 vial ( 3 milliliters ) in nebulizer by mouth. ..  (REFER TO PRESCRIPTION NOTES). 3/10/21  Yes Provider, Historical   furosemide (LASIX) 40 mg tablet take 1 tablet by mouth twice a day if needed for EDEMA. FOLLOW UP WITH DR Alfredo Caban 1/20/21  Yes Provider, Historical   Trelegy Ellipta 100-62.5-25 mcg inhaler inhale 1 puff by mouth and INTO THE LUNGS once daily Rinse mouth after use 11/13/20  Yes Provider, Historical   metoprolol succinate (TOPROL-XL) 100 mg tablet take 1 tablet by mouth once daily 10/30/20  Yes Provider, Historical   cyclobenzaprine (FLEXERIL) 10 mg tablet Take  by mouth three (3) times daily as needed for Muscle Spasm(s). Yes Provider, Historical   albuterol-ipratropium (DUO-NEB) 2.5 mg-0.5 mg/3 ml nebu 3 mL by Nebulization route. Yes Provider, Historical   losartan (COZAAR) 100 mg tablet Take 100 mg by mouth daily. Yes Provider, Historical   losartan (COZAAR) 100 mg tablet Take 100 mg by mouth daily. Yes Provider, Historical   albuterol (ProAir HFA) 90 mcg/actuation inhaler Take  by inhalation. Yes Provider, Historical   zolpidem CR (AMBIEN CR) 12.5 mg tablet Take 12.5 mg by mouth nightly as needed for Sleep.    Yes Provider, Historical   Stool Softener 100 mg capsule  12/8/21   Provider, Historical   ursodioL (ACTIGALL) 250 mg tablet  11/24/21   Provider, Historical   ondansetron [facility] ODT) 4 mg disintegrating tablet  12/1/21   Provider, Historical   fluconazole (DIFLUCAN) 150 mg tablet 1 tab today and 2nd tablet after 72 hours  Patient not taking: Reported on 1/10/2022 11/9/21   Gayathri Prakash MD   terconazole (TERAZOL 3) 0.8 % vaginal cream insert 1 applicatorful vaginally once daily at bedtime for 3 days 9/7/21   Agnieszka Myles MD   atenoloL (TENORMIN) 50 mg tablet Take 50 mg by mouth two (2) times a day. 8/14/21   Provider, Historical   sub-q insulin device, 40 unit (V-GO 40) may Use as directed once a day, 90 days 8/26/21   Gayathri Prakash MD   clotrimazole-betamethasone (LOTRISONE) topical cream Apply to external ears twice daily x 1 week 3/29/21   Hair Castrejon MD   Syringe with Needle, Disp, 1 mL 25 gauge x 1\" syrg Once a day to fill V-GO 3/8/21   Gayathri Prakash MD   flash glucose scanning reader (FreeStyle Davide 2 [place]) misc Check glucose 4 times a day 3/5/21   Gayathri Prakash MD   glucose blood VI test strips (OneTouch Verio test strips) strip Use to check glucose twice a day 2/5/21   Gayathri Prakash MD   lancets (OneTouch Delica Plus Lancet) 33 gauge misc Use to check glucose twice a day 2/5/21   Gayathri Prakash MD   Insulin Needles, Disposable, (BD Ultra-Fine Short Pen Needle) 31 gauge x 5/16\" ndle Use to take insulin 4 times a day 12/28/20   Gayathri Prakash MD   traMADoL Billie Samantha) 50 mg tablet take 1 tablet by mouth three times a day if needed for Breakthrough pain 9/15/20   Provider, Historical   predniSONE (DELTASONE) 5 mg tablet Take  by mouth. Provider, Historical   atenoloL (TENORMIN) 50 mg tablet Take  by mouth daily. Provider, Historical   insulin glargine (Basaglar KwikPen U-100 Insulin) 100 unit/mL (3 mL) inpn by SubCUTAneous route. Provider, Historical   Insulin Needles, Disposable, (BD Ultra-Fine Short Pen Needle) 31 gauge x 5/16\" ndle by Does Not Apply route.     Provider, Historical   glycopyrrolate-formoteroL (Bevespi Aerosphere) 9-4.8 mcg HFA inhaler Take 2 Puffs by inhalation two (2) times a day. Provider, Historical   ciprofloxacin-dexamethasone (Ciprodex) 0.3-0.1 % otic suspension Administer 4 Drops in left ear two (2) times a day. Provider, Historical   ciprofloxacin HCl (CIPRO) 500 mg tablet Take  by mouth two (2) times a day. Provider, Historical   DOXYCYCLINE MONOHYDRATE PO Take  by mouth. Provider, Historical   fluticasone propionate (Flovent HFA) 110 mcg/actuation inhaler Take  by inhalation. Patient not taking: Reported on 1/10/2022    Provider, Historical   fluconazole (DIFLUCAN) 150 mg tablet Take 150 mg by mouth daily. FDA advises cautious prescribing of oral fluconazole in pregnancy. Patient not taking: Reported on 1/10/2022    Provider, Historical   meloxicam (MOBIC) 7.5 mg tablet Take  by mouth daily. Provider, Historical   metroNIDAZOLE (FLAGYL) 500 mg tablet Take  by mouth three (3) times daily. Patient not taking: Reported on 1/10/2022    Provider, Historical   lancets (OneTouch Delica Plus Lancet) 33 gauge misc by Does Not Apply route. Provider, Historical   glucose blood VI test strips (OneTouch Verio test strips) strip by Does Not Apply route See Admin Instructions. Provider, Historical   PARoxetine mesylate,menop.sym, 7.5 mg cap Take  by mouth. Provider, Historical   pneumococcal 23-valent (Pneumovax-23) 25 mcg/0.5 mL injection 0.5 mL by IntraMUSCular route PRIOR TO DISCHARGE. Patient not taking: Reported on 7/11/2022    Provider, Historical   varicella-zoster recombinant, PF, (Shingrix, PF,) 50 mcg/0.5 mL susr injection 0.5 mL by IntraMUSCular route once. Patient not taking: Reported on 7/11/2022    Provider, Historical   tobramycin-dexamethasone (TOBRADEX) ophthalmic suspension 1 Drop every four (4) hours (while awake). Patient not taking: Reported on 7/11/2022    Provider, Historical   amLODIPine (NORVASC) 5 mg tablet Take 5 mg by mouth daily.   Patient not taking: Reported on 7/11/2022    Provider, Historical glycopyrrolate-formoteroL (Bevespi Aerosphere) 9-4.8 mcg HFA inhaler Take 2 Puffs by inhalation two (2) times a day. Provider, Historical   cholecalciferol, vitamin D3, (Vitamin D3) 50 mcg (2,000 unit) tab Take  by mouth. Patient not taking: Reported on 7/11/2022    Provider, Historical   nebivoloL (Bystolic) 5 mg tablet Take  by mouth daily. Patient not taking: Reported on 7/11/2022    Provider, Historical        No Known Allergies    Review of Systems:  ROS    A comprehensive review of systems was preformed and it is negative except mentioned in HPI    Objective:     Vitals:    07/11/22 1006   BP: 106/70   Pulse: 63   Resp: 20   Temp: 96.9 °F (36.1 °C)   TempSrc: Temporal   SpO2: 98%   Weight: 219 lb 12.8 oz (99.7 kg)   Height: 5' 3\" (1.6 m)        Physical Exam:    Physical Exam  Constitutional:       Appearance: Normal appearance. HENT:      Head: Normocephalic and atraumatic. Cardiovascular:      Rate and Rhythm: Normal rate and regular rhythm. Pulmonary:      Effort: Pulmonary effort is normal.      Breath sounds: Normal breath sounds. Neurological:      Mental Status: She is alert. Labs and Imaging:    Results for Sridevi Lee (MRN [id number]) as of 4/23/2021 12:51   Ref.  Range 3/18/2021 10:25   Sodium Latest Ref Range: 134 - 144 mmol/L 143   Potassium Latest Ref Range: 3.5 - 5.2 mmol/L 4.2   Chloride Latest Ref Range: 96 - 106 mmol/L 100   CO2 Latest Ref Range: 20 - 29 mmol/L 28   Glucose Latest Ref Range: 65 - 99 mg/dL 153 (H)   BUN Latest Ref Range: 6 - 24 mg/dL 16   Creatinine Latest Ref Range: 0.57 - 1.00 mg/dL 0.72   BUN/Creatinine ratio Latest Ref Range: 9 - 23  22   Calcium Latest Ref Range: 8.7 - 10.2 mg/dL 9.8   GFR est non-AA Latest Ref Range: >59 mL/min/1.73 96   GFR est AA Latest Ref Range: >59 mL/min/1.73 111   Bilirubin, total Latest Ref Range: 0.0 - 1.2 mg/dL 0.7   Protein, total Latest Ref Range: 6.0 - 8.5 g/dL 7.3   Albumin Latest Ref Range: 3.8 - 4.9 g/dL 4.5   A-G Ratio Latest Ref Range: 1.2 - 2.2  1.6   ALT Latest Ref Range: 0 - 32 IU/L 23   AST Latest Ref Range: 0 - 40 IU/L 20   Alk. phosphatase Latest Ref Range: 39 - 117 IU/L 133 (H)   Results for Scar Lomeli (MRN [id number]) as of 4/23/2021 12:51   Ref. Range 3/18/2021 10:25   Triglyceride Latest Ref Range: 0 - 149 mg/dL 127   Cholesterol, total Latest Ref Range: 100 - 199 mg/dL 175   HDL Cholesterol Latest Ref Range: >39 mg/dL 51   VLDL, calculated Latest Ref Range: 5 - 40 mg/dL 23   LDL, calculated Latest Ref Range: 0 - 99 mg/dL 101 (H)   Results for Scar Lomeli (MRN [id number]) as of 4/23/2021 12:51   Ref. Range 3/18/2021 10:25   Creatinine, urine Latest Ref Range: Not Estab. mg/dL 74.3   Microalbumin, urine Latest Ref Range: Not Estab. ug/mL 39.1   Microalbumin/Creat. Ratio Latest Ref Range: 0 - 29 mg/g creat 53 (H)   Results for Scar Lomeli (MRN [id number]) as of 4/23/2021 12:51   Ref.  Range 3/18/2021 10:25   TSH Latest Ref Range: 0.450 - 4.500 uIU/mL 1.740     Last 3 Recorded Weights in this Encounter    07/11/22 1006   Weight: 219 lb 12.8 oz (99.7 kg)        Lab Results   Component Value Date/Time    Hemoglobin A1c 8.9 (H) 09/09/2020 11:34 AM    Hemoglobin A1c, External 9.1 02/04/2020 12:00 AM    Hemoglobin A1c, External 9.1 02/04/2020 12:00 AM        Assessment:     Patient Active Problem List   Diagnosis Code    [facility] 40.0-44.9, [facility] Z68.41    Chronic obstructive lung disease ([facility] J44.9    Essential hypertension I10    Hypercholesterolemia E78.00    Ingrown toenail L60.0    Long term (current) use of systemic steroids Z79.52    Mixed hyperlipidemia E78.2    Morbid obesity (HCC) E66.01    Obstructive sleep apnea of adult G47.33    Diabetes mellitus type 2, controlled ([address] E11.9    Uncontrolled type 2 diabetes mellitus [id number]    Obesity E66.9    Chronic otitis media H66.90    Cyst of ovary N83.209    Endometriosis N80.9    Tetralogy of Fallot Q21.3    Type 2 diabetes mellitus with hyperglycemia, with long-term current use of insulin ([facility]) E11.65, Z79.4    Chronic otitis externa H60.60    Body mass index 40.0-44.9, adult ([facility]) Z68.41    Chronic obstructive lung disease ([facility] J44.9    Essential hypertension I10    Hypercholesterolemia E78.00    Ingrowing toenail L60.0    Long term (current) use of systemic steroids Z79.52    Mixed hyperlipidemia E78.2    Morbid obesity ([facility]) E66.01    Obstructive sleep apnea of adult G47.33    Type 2 diabetes mellitus ([facility]) E11.9    Chronic otitis media H66.90           Plan:     type II diabetes mellitus well controlled  Hemoglobin A1c was 9.5% on 5-, 6.4% on 8-, 9.1% on 2-4-2020, 8.9% on 9-9-2020, 9.7% in my office on 3-5-2021, 8.4% on 7-9-2021, 7.7% on 11-8-2021 at PCP office, 7.2% on 1-5-2022,   6.8% 4-, 6.4% today. Up to date with diabetes related annual labs: March 2021    Up to date with diabetic eye exam: no    plan:  Encourage patient to work on diet. Advised patient to talk to dietitian at bariatric center if she has more hypoglycemia. Continue Trulicity 2.24 mg weekly. Check blood glucose once a day every day and follow-up with PCP in 3 months. not a good candidate for SGLT-2 inhibitor because of frequent UTI and vaginal yeast infections, not a good candidate for pioglitazone because of obesity, unable to tolerate metformin and metformin ER, not a candidate for GLP-1 agonist because she is on Januvia.    essential hypertension  Blood pressure well controlled on current medications. No microalbuminuria, last checked in March 2021. Continue current medications. mixed hyperlipidemia  1-: LDL elevated at 128. Increased atorvastatin from 20 mg to 40 mg. LDL repeated in September 2020 was better but still elevated at 114. Increase atorvastatin to 80 mg daily at bedtime. March 2021 total cholesterol 175, triglycerides 127, .  1-5-2022:  Total cholesterol 150, triglycerides 162, LDL 77.  Continue atorvastatin 80 mg daily at bedtime. morbid obesity  S/p gastric bypass surgery on 2021. Lost almost 70 pounds since then. ingrowing toenail  Has been taken care of by podiatrist.    long-term current use of systemic steroid  Patient knows that she should not stop prednisone abruptly because she is steroid-dependent. obstructive sleep apnea of adult  Unable to use CPAP because of sinus infection. Patient's pulmonologist is aware. Orders Placed This Encounter    AMB POC HEMOGLOBIN A1C    dulaglutide (Trulicity) 1.01 SY/5.0 mL sub-q pen     Si.5 mL by SubCUTAneous route every seven (7) days for 30 days.      Dispense:  2 mL     Refill:  5     DC Januvia    Blood-Glucose Meter (OneTouch Verio Meter) misc     Sig: Check glucose once a day     Dispense:  1 Each     Refill:  0    glucose blood VI test strips (OneTouch Verio test strips) strip     Sig: Check glucose once a day     Dispense:  50 Strip     Refill:  5    lancets (One Touch Delica) 33 gauge misc     Sig: Check glucose once a day     Dispense:  50 Each     Refill:  5        Signed By: Tracy Moore MD     2022      Return to clinic 3 months

## 2022-07-11 NOTE — LETTER
7/11/2022    Patient: Randi Rangel   YOB: 1965   Date of Visit: 7/11/2022     [address] [phone]  Via Fax: [phone]    Dear Marcella Carrillo MD,      Thank you for referring Ms. Jazmin Pickard to [address] [place] [address] for evaluation. My notes for this consultation are attached. If you have questions, please do not hesitate to call me. I look forward to following your patient along with you.       Sincerely,    Sarah Arthur MD

## 2022-09-16 ENCOUNTER — OFFICE VISIT (OUTPATIENT)
Dept: OBGYN CLINIC | Age: 57
End: 2022-09-16

## 2022-09-16 VITALS
SYSTOLIC BLOOD PRESSURE: 154 MMHG | BODY MASS INDEX: 37.11 KG/M2 | DIASTOLIC BLOOD PRESSURE: 71 MMHG | WEIGHT: 217.38 LBS | HEIGHT: 64 IN

## 2022-09-16 DIAGNOSIS — Z08 VAGINAL PAP SMEAR FOLLOWING HYSTERECTOMY FOR MALIGNANCY: ICD-10-CM

## 2022-09-16 DIAGNOSIS — Z90.710 VAGINAL PAP SMEAR FOLLOWING HYSTERECTOMY FOR MALIGNANCY: ICD-10-CM

## 2022-09-16 DIAGNOSIS — Z00.00 ANNUAL VISIT FOR GENERAL ADULT MEDICAL EXAMINATION WITHOUT ABNORMAL FINDINGS: Primary | ICD-10-CM

## 2022-09-16 DIAGNOSIS — N95.1 MENOPAUSAL SYNDROME: ICD-10-CM

## 2022-09-16 DIAGNOSIS — N76.0 VAGINITIS AND VULVOVAGINITIS: ICD-10-CM

## 2022-09-16 PROCEDURE — G8510 SCR DEP NEG, NO PLAN REQD: HCPCS | Performed by: OBSTETRICS & GYNECOLOGY

## 2022-09-16 PROCEDURE — G8417 CALC BMI ABV UP PARAM F/U: HCPCS | Performed by: OBSTETRICS & GYNECOLOGY

## 2022-09-16 PROCEDURE — G0101 CA SCREEN;PELVIC/BREAST EXAM: HCPCS | Performed by: OBSTETRICS & GYNECOLOGY

## 2022-09-16 PROCEDURE — 3017F COLORECTAL CA SCREEN DOC REV: CPT | Performed by: OBSTETRICS & GYNECOLOGY

## 2022-09-16 PROCEDURE — G9899 SCRN MAM PERF RSLTS DOC: HCPCS | Performed by: OBSTETRICS & GYNECOLOGY

## 2022-09-16 PROCEDURE — G8753 SYS BP > OR = 140: HCPCS | Performed by: OBSTETRICS & GYNECOLOGY

## 2022-09-16 PROCEDURE — G8754 DIAS BP LESS 90: HCPCS | Performed by: OBSTETRICS & GYNECOLOGY

## 2022-09-16 RX ORDER — OFLOXACIN 3 MG/ML
SOLUTION AURICULAR (OTIC)
COMMUNITY
Start: 2022-07-14

## 2022-09-16 RX ORDER — NYSTATIN 100000 U/G
OINTMENT TOPICAL 2 TIMES DAILY
Qty: 15 G | Refills: 0 | Status: SHIPPED | OUTPATIENT
Start: 2022-09-16 | End: 2022-10-17

## 2022-09-16 RX ORDER — METOPROLOL TARTRATE 50 MG/1
TABLET ORAL
COMMUNITY
Start: 2022-09-13

## 2022-09-16 RX ORDER — PAROXETINE 10 MG/1
10 TABLET, FILM COATED ORAL DAILY
Qty: 30 TABLET | Refills: 5 | Status: SHIPPED | OUTPATIENT
Start: 2022-09-16

## 2022-09-16 RX ORDER — TERCONAZOLE 8 MG/G
1 CREAM VAGINAL
Qty: 20 G | Refills: 1 | Status: SHIPPED | OUTPATIENT
Start: 2022-09-16 | End: 2022-09-19

## 2022-09-16 NOTE — PROGRESS NOTES
Ashley Chaparro is a 56 y.o. female who presents today for the following:  Chief Complaint   Patient presents with    Annual Exam     Pt presents for annual exam with complaints of vaginal irritation and dryness //MKeeley     Vaginitis    Other        No Known Allergies    Current Outpatient Medications   Medication Sig    PARoxetine (PAXIL) 10 mg tablet Take 1 Tablet by mouth daily. terconazole (TERAZOL 3) 0.8 % vaginal cream Insert 1 Applicator into vagina nightly for 3 days. nystatin (MYCOSTATIN) 100,000 unit/gram ointment Apply  to affected area two (2) times a day. Apply to affected area bid x 10 days    metoprolol tartrate (LOPRESSOR) 50 mg tablet     ofloxacin (FLOXIN) 0.3 % otic solution PLACE 10 DROPS INTO AFFECTED EAR ONCE A DAY FOR 10 DAYS    dulaglutide (Trulicity) 1.59 QX/7.2 mL sub-q pen 0.75 mg by SubCUTAneous route every seven (7) days. Blood-Glucose Meter (OneTouch Verio Meter) misc Check glucose once a day    glucose blood VI test strips (OneTouch Verio test strips) strip Check glucose once a day    lancets (One Touch Delica) 33 gauge misc Check glucose once a day    atorvastatin (LIPITOR) 80 mg tablet     albuterol-ipratropium (DUO-NEB) 2.5 mg-0.5 mg/3 ml nebu inhale contents of 1 vial ( 3 milliliters ) in nebulizer by mouth. ..  (REFER TO PRESCRIPTION NOTES). furosemide (LASIX) 40 mg tablet take 1 tablet by mouth twice a day if needed for EDEMA. FOLLOW UP WITH DR Ghazala Fish Ellipta 100-62.5-25 mcg inhaler inhale 1 puff by mouth and INTO THE LUNGS once daily Rinse mouth after use    metoprolol succinate (TOPROL-XL) 100 mg tablet take 1 tablet by mouth once daily    cyclobenzaprine (FLEXERIL) 10 mg tablet Take  by mouth three (3) times daily as needed for Muscle Spasm(s). albuterol-ipratropium (DUO-NEB) 2.5 mg-0.5 mg/3 ml nebu 3 mL by Nebulization route. losartan (COZAAR) 100 mg tablet Take 100 mg by mouth daily.     pneumococcal 23-valent (Pneumovax-23) 25 mcg/0.5 mL injection 0.5 mL by IntraMUSCular route PRIOR TO DISCHARGE. (Patient not taking: Reported on 2022)    varicella-zoster recombinant, PF, (Shingrix, PF,) 50 mcg/0.5 mL susr injection 0.5 mL by IntraMUSCular route once. (Patient not taking: Reported on 2022)    tobramycin-dexamethasone (TOBRADEX) ophthalmic suspension 1 Drop every four (4) hours (while awake). (Patient not taking: Reported on 2022)    amLODIPine (NORVASC) 5 mg tablet Take 5 mg by mouth daily. (Patient not taking: Reported on 2022)    losartan (COZAAR) 100 mg tablet Take 100 mg by mouth daily. albuterol (ProAir HFA) 90 mcg/actuation inhaler Take  by inhalation. cholecalciferol, vitamin D3, (Vitamin D3) 50 mcg (2,000 unit) tab Take  by mouth. (Patient not taking: Reported on 2022)    nebivoloL (Bystolic) 5 mg tablet Take  by mouth daily. (Patient not taking: Reported on 2022)     No current facility-administered medications for this visit.        Past Medical History:   Diagnosis Date    Body mass index 40.0-44.9, adult ([address] 2020    Chronic obstructive lung disease ([address] 2020    Chronic otitis externa 3/2/2020    Chronic otitis media 2020    Cyst of ovary 2020    Diabetes ([address]     Diabetes mellitus type 2, controlled ([address] 2020    Ear problems     Endometriosis 2020    Essential hypertension 2020    High cholesterol     Hypercholesterolemia 2020    Hypertension     Ingrown toenail 2020    Long term (current) use of systemic steroids 2020    Mixed hyperlipidemia 2020    Morbid obesity ([address] 2020    Obesity     Obstructive sleep apnea of adult 2020    Tetralogy of Fallot 2020    Uncontrolled type 2 diabetes mellitus 2020       Past Surgical History:   Procedure Laterality Date    HX BACK SURGERY      HX BREAST BIOPSY Left     benign    HX  SECTION      x2    HX GASTRIC BYPASS  2021    HX HYSTERECTOMY      HX TONSILLECTOMY      HX TONSILLECTOMY      HX TUBAL LIGATION      NE CARDIAC SURG PROCEDURE UNLIST         Family History   Problem Relation Age of Onset    Diabetes Mother        Social History     Socioeconomic History    Marital status:      Spouse name: Not on file    Number of children: Not on file    Years of education: Not on file    Highest education level: Not on file   Occupational History    Not on file   Tobacco Use    Smoking status: Former     Packs/day: 0.00     Years: 30.00     Pack years: 0.00     Types: Cigarettes     Quit date: 2018     Years since quittin.0    Smokeless tobacco: Never   Vaping Use    Vaping Use: Never used   Substance and Sexual Activity    Alcohol use: Not Currently    Drug use: Never    Sexual activity: Yes     Partners: Male   Other Topics Concern     Service Not Asked    Blood Transfusions Not Asked    Caffeine Concern Not Asked    Occupational Exposure Not Asked    Hobby Hazards Not Asked    Sleep Concern Not Asked    Stress Concern Not Asked    Weight Concern Not Asked    Special Diet Not Asked    Back Care Not Asked    Exercise Not Asked    Bike Helmet Not Asked    Seat Belt Not Asked    Self-Exams Not Asked   Social History Narrative    ** Merged History Encounter **          Social Determinants of Health     Financial Resource Strain: Not on file   Food Insecurity: Not on file   Transportation Needs: Not on file   Physical Activity: Not on file   Stress: Not on file   Social Connections: Not on file   Intimate Partner Violence: Not on file   Housing Stability: Not on file         ROS   Review of Systems   Constitutional: Negative. HENT: Negative. Eyes: Negative. Respiratory: Negative. Cardiovascular: Negative. Gastrointestinal: Negative. Genitourinary: Negative. Musculoskeletal: Negative. Skin: Negative. Neurological: Negative. Endo/Heme/Allergies: Negative. Psychiatric/Behavioral: Negative.       BP (!) 154/71   Ht 5' 3.5\" (1.613 m)   Wt 217 lb 6 oz (98.6 kg)   BMI 37.90 kg/m²    OBGyn Exam   Constitutional  Appearance: well-nourished, well developed, alert, in no acute distress    HENT  Head and Face: appears normal    Neck  Inspection/Palpation: normal appearance, no masses or tenderness  Lymph Nodes: no lymphadenopathy present  Thyroid: gland size normal, nontender, no nodules or masses present on palpation    Breasts  Symmetric, no palpable masses, no tenderness, no skin changes, no nipple abnormality, no nipple discharge, no lymphadenopathy. Chest  Respiratory Effort: breathing labored  Auscultation: normal breath sounds    Cardiovascular  Heart: Auscultation: regular rate and rhythm without murmur    Gastrointestinal  Abdominal Examination: abdomen non-tender to palpation, normal bowel sounds, no masses present  Liver and spleen: no hepatomegaly present, spleen not palpable  Hernias: no hernias identified    Genitourinary  External Genitalia: normal appearance for age, no discharge present, no tenderness present, inflammatory lesions present, no masses present, atrophy present  Vagina: normal vaginal vault without central or paravaginal defects, no discharge present, no inflammatory lesions present, no masses present  Bladder: non-tender to palpation  Urethra: appears normal  Cervix: ABSENT   Uterus: nABSENT  Adnexa: no adnexal tenderness present, no adnexal masses present  Perineum: perineum within normal limits, no evidence of trauma, no rashes or skin lesions present  Anus: anus within normal limits, no hemorrhoids present  Inguinal Lymph Nodes: no lymphadenopathy present    Skin  General Inspection: no rash, no lesions identified    Neurologic/Psychiatric  Mental Status:  Orientation: grossly oriented to person, place and time  Mood and Affect: mood normal, affect appropriate    No results found for this visit on 09/16/22.      Orders Placed This Encounter    metoprolol tartrate (LOPRESSOR) 50 mg tablet    ofloxacin (FLOXIN) 0.3 % otic solution     Sig: PLACE 10 DROPS INTO AFFECTED EAR ONCE A DAY FOR 10 DAYS    PARoxetine (PAXIL) 10 mg tablet     Sig: Take 1 Tablet by mouth daily. Dispense:  30 Tablet     Refill:  5    terconazole (TERAZOL 3) 0.8 % vaginal cream     Sig: Insert 1 Applicator into vagina nightly for 3 days. Dispense:  20 g     Refill:  1    nystatin (MYCOSTATIN) 100,000 unit/gram ointment     Sig: Apply  to affected area two (2) times a day. Apply to affected area bid x 10 days     Dispense:  15 g     Refill:  0     55 YO [address], WAS ON BRISDELL AND WAS HELPING BUT INSURANCE DOES NOT COVER IT ANY MORE  HX OF IDDM, HAD WEIGHT LOSS SURGERY, HGB A1C BETTER BUT STILL HAVE VAG IRRITATION AND PAIN DURING INTERCOURSE    1. Annual visit for general adult medical examination without abnormal findings      2. Vaginitis and vulvovaginitis    - terconazole (TERAZOL 3) 0.8 % vaginal cream; Insert 1 Applicator into vagina nightly for 3 days. Dispense: 20 g; Refill: 1  - nystatin (MYCOSTATIN) 100,000 unit/gram ointment; Apply  to affected area two (2) times a day. Apply to affected area bid x 10 days  Dispense: 15 g; Refill: 0    3. Menopausal syndrome    - PARoxetine (PAXIL) 10 mg tablet; Take 1 Tablet by mouth daily. Dispense: 30 Tablet; Refill: 5    4. DYSPAREUNIA  ?  YEAST, WILL SYMPTOMATICALLY TREAT  IF CX NEGATIVE, WILL CALL ESTROGEN CREAM OR VAGIFEM

## 2022-09-20 LAB
A VAGINAE DNA VAG QL NAA+PROBE: NORMAL SCORE
BVAB2 DNA VAG QL NAA+PROBE: NORMAL SCORE
C ALBICANS DNA VAG QL NAA+PROBE: NEGATIVE
C GLABRATA DNA VAG QL NAA+PROBE: NEGATIVE
C KRUSEI DNA VAG QL NAA+PROBE: NEGATIVE
C LUSITANIAE DNA VAG QL NAA+PROBE: NEGATIVE
C TRACH DNA VAG QL NAA+PROBE: NEGATIVE
CANDIDA DNA VAG QL NAA+PROBE: NEGATIVE
MEGA1 DNA VAG QL NAA+PROBE: NORMAL SCORE
N GONORRHOEA DNA VAG QL NAA+PROBE: NEGATIVE
T VAGINALIS DNA VAG QL NAA+PROBE: NEGATIVE

## 2022-09-22 LAB
CYTOLOGIST CVX/VAG CYTO: ABNORMAL
CYTOLOGY CVX/VAG DOC CYTO: ABNORMAL
CYTOLOGY CVX/VAG DOC THIN PREP: ABNORMAL
DX ICD CODE: ABNORMAL
DX ICD CODE: ABNORMAL
HPV I/H RISK 4 DNA CVX QL PROBE+SIG AMP: NEGATIVE
LABCORP, 190119: ABNORMAL
Lab: ABNORMAL
OTHER STN SPEC: ABNORMAL
PATHOLOGIST CVX/VAG CYTO: ABNORMAL
STAT OF ADQ CVX/VAG CYTO-IMP: ABNORMAL

## 2023-01-11 DIAGNOSIS — N76.0 VAGINITIS AND VULVOVAGINITIS: ICD-10-CM

## 2023-01-12 RX ORDER — NYSTATIN 100000 U/G
OINTMENT TOPICAL
Qty: 15 G | Refills: 0 | Status: SHIPPED | OUTPATIENT
Start: 2023-01-12

## 2023-08-06 DIAGNOSIS — N76.0 ACUTE VAGINITIS: ICD-10-CM

## 2023-08-07 RX ORDER — NYSTATIN 100000 U/G
OINTMENT TOPICAL
Qty: 15 G | Refills: 0 | Status: SHIPPED | OUTPATIENT
Start: 2023-08-07

## 2023-11-14 DIAGNOSIS — N76.0 ACUTE VAGINITIS: ICD-10-CM

## 2023-11-15 RX ORDER — NYSTATIN 100000 U/G
OINTMENT TOPICAL
Qty: 15 G | Refills: 1 | Status: SHIPPED | OUTPATIENT
Start: 2023-11-15

## 2024-02-06 ENCOUNTER — TELEPHONE (OUTPATIENT)
Age: 59
End: 2024-02-06

## 2024-02-06 NOTE — TELEPHONE ENCOUNTER
02/06/24 2:00PM R/T call to the patient as she left a  requesting a new patient appt--unable to reach the patient--M for the patient to call back.

## 2024-02-12 LAB — HBA1C MFR BLD HPLC: 7.2 %

## 2024-05-03 ENCOUNTER — OFFICE VISIT (OUTPATIENT)
Age: 59
End: 2024-05-03
Payer: MEDICARE

## 2024-05-03 VITALS
HEIGHT: 63 IN | OXYGEN SATURATION: 96 % | SYSTOLIC BLOOD PRESSURE: 116 MMHG | HEART RATE: 76 BPM | DIASTOLIC BLOOD PRESSURE: 74 MMHG | WEIGHT: 228.8 LBS | BODY MASS INDEX: 40.54 KG/M2 | TEMPERATURE: 97.3 F

## 2024-05-03 DIAGNOSIS — Z79.4 TYPE 2 DIABETES MELLITUS WITH HYPERGLYCEMIA, WITH LONG-TERM CURRENT USE OF INSULIN (HCC): Primary | ICD-10-CM

## 2024-05-03 DIAGNOSIS — E78.00 HYPERCHOLESTEROLEMIA: ICD-10-CM

## 2024-05-03 DIAGNOSIS — E66.01 CLASS 3 SEVERE OBESITY DUE TO EXCESS CALORIES WITH SERIOUS COMORBIDITY AND BODY MASS INDEX (BMI) OF 40.0 TO 44.9 IN ADULT (HCC): ICD-10-CM

## 2024-05-03 DIAGNOSIS — E11.65 TYPE 2 DIABETES MELLITUS WITH HYPERGLYCEMIA, WITH LONG-TERM CURRENT USE OF INSULIN (HCC): Primary | ICD-10-CM

## 2024-05-03 PROCEDURE — 99214 OFFICE O/P EST MOD 30 MIN: CPT | Performed by: INTERNAL MEDICINE

## 2024-05-03 PROCEDURE — 4004F PT TOBACCO SCREEN RCVD TLK: CPT | Performed by: INTERNAL MEDICINE

## 2024-05-03 PROCEDURE — 3078F DIAST BP <80 MM HG: CPT | Performed by: INTERNAL MEDICINE

## 2024-05-03 PROCEDURE — 3046F HEMOGLOBIN A1C LEVEL >9.0%: CPT | Performed by: INTERNAL MEDICINE

## 2024-05-03 PROCEDURE — 3074F SYST BP LT 130 MM HG: CPT | Performed by: INTERNAL MEDICINE

## 2024-05-03 PROCEDURE — 2022F DILAT RTA XM EVC RTNOPTHY: CPT | Performed by: INTERNAL MEDICINE

## 2024-05-03 PROCEDURE — G8428 CUR MEDS NOT DOCUMENT: HCPCS | Performed by: INTERNAL MEDICINE

## 2024-05-03 PROCEDURE — 3017F COLORECTAL CA SCREEN DOC REV: CPT | Performed by: INTERNAL MEDICINE

## 2024-05-03 PROCEDURE — G8419 CALC BMI OUT NRM PARAM NOF/U: HCPCS | Performed by: INTERNAL MEDICINE

## 2024-05-03 RX ORDER — CETIRIZINE HYDROCHLORIDE 10 MG/1
10 TABLET ORAL AS NEEDED
COMMUNITY
Start: 2024-04-03

## 2024-05-03 RX ORDER — PREDNISONE 10 MG/1
10 TABLET ORAL AS NEEDED
COMMUNITY
Start: 2024-04-03

## 2024-05-03 RX ORDER — CALCIUM CITRATE/VITAMIN D3 200MG-6.25
TABLET ORAL
COMMUNITY
Start: 2023-04-03

## 2024-05-03 RX ORDER — ZOLPIDEM TARTRATE 10 MG/1
5 TABLET ORAL NIGHTLY PRN
COMMUNITY

## 2024-05-03 RX ORDER — DOXYCYCLINE 100 MG/1
100 CAPSULE ORAL AS NEEDED
COMMUNITY
Start: 2024-04-03

## 2024-05-03 RX ORDER — DOCUSATE SODIUM 100 MG/1
100 CAPSULE, LIQUID FILLED ORAL 2 TIMES DAILY PRN
COMMUNITY
Start: 2024-03-29

## 2024-05-03 RX ORDER — GLUCOSAM/CHON-MSM1/C/MANG/BOSW 500-416.6
TABLET ORAL
COMMUNITY
Start: 2024-04-20

## 2024-05-03 RX ORDER — FLUCONAZOLE 150 MG/1
150 TABLET ORAL ONCE
COMMUNITY

## 2024-05-03 RX ORDER — FLUTICASONE PROPIONATE 50 MCG
1 SPRAY, SUSPENSION (ML) NASAL DAILY
COMMUNITY
Start: 2024-04-03

## 2024-05-03 RX ORDER — SEMAGLUTIDE 0.68 MG/ML
0.5 INJECTION, SOLUTION SUBCUTANEOUS WEEKLY
COMMUNITY
Start: 2024-02-14

## 2024-05-03 RX ORDER — ONDANSETRON 4 MG/1
4 TABLET, ORALLY DISINTEGRATING ORAL EVERY 8 HOURS PRN
COMMUNITY
Start: 2024-02-22

## 2024-05-03 NOTE — ASSESSMENT & PLAN NOTE
The ASCVD Risk score (Shay LORENZO, et al., 2019) failed to calculate for the following reasons:    Cannot find a previous HDL lab    Cannot find a previous total cholesterol lab  Continue atorvastatin 80 mg daily

## 2024-05-03 NOTE — ASSESSMENT & PLAN NOTE
S/p gastric bypass surgery on 11-. Lost almost 70 pounds since then then re-gained some weight.   Control COPD to limit use of prednisone.

## 2024-05-03 NOTE — PROGRESS NOTES
Mindy Olivier is a 59 y.o. female here for   Chief Complaint   Patient presents with    Diabetes       1. Have you been to the ER, urgent care clinic since your last visit?  Hospitalized since your last visit? -N/A    2. Have you seen or consulted any other health care providers outside of the [facility] since your last visit?  Include any pap smears or colon screening.-N/A

## 2024-05-03 NOTE — ASSESSMENT & PLAN NOTE
Near goal  not a good candidate for SGLT-2 inhibitor because of frequent UTI and vaginal yeast infections, not a good candidate for pioglitazone because of obesity, unable to tolerate metformin and metformin ER, not a candidate for GLP-1 agonist because she is on Januvia.   Increase Ozempic to 1 mg once a week   I reviewed the benefits of GLP-1 such as improved glycemic control, weight loss, risk reduction in major cardiovascular events such as CAD/ CVA  I reviewed in details the adverse effects of GLP-1/ GLP-GIP agents including nausea, vomiting, diarrhea, bloating, abdominal pain, constipation and the black box warning regarding risk for pancreatitis and medullary thyroid cancer noted in mice. Patient does not have any contraindications to this medication. Patient was also advised to stop this medication for at least 1 week prior to any procedure, or longer if specified by proceduralist.   CLARICE hopkins/keri goodson

## 2024-05-06 ENCOUNTER — TELEPHONE (OUTPATIENT)
Age: 59
End: 2024-05-06

## 2024-05-06 NOTE — TELEPHONE ENCOUNTER
Pt was made aware that paperwork was faxed over last week. Pt confirmed that paperwork was received by [employer].

## 2024-05-07 NOTE — TELEPHONE ENCOUNTER
I spoke to representative at [facility] yesterday. They needed to verify MD license number.

## 2024-05-07 NOTE — TELEPHONE ENCOUNTER
[employer] left voice mail Liss stating she has questions on patient assistance forms received # [phone]

## 2024-05-16 ENCOUNTER — TELEPHONE (OUTPATIENT)
Age: 59
End: 2024-05-16

## 2024-05-16 DIAGNOSIS — Z12.31 SCREENING MAMMOGRAM, ENCOUNTER FOR: Primary | ICD-10-CM

## 2024-05-17 ENCOUNTER — TELEPHONE (OUTPATIENT)
Age: 59
End: 2024-05-17

## 2024-05-22 ENCOUNTER — TELEPHONE (OUTPATIENT)
Age: 59
End: 2024-05-22

## 2024-05-22 NOTE — TELEPHONE ENCOUNTER
Pt was made aware of the arrival of the Ozempic. Pt stated she'll be here to  medication today before office closes.

## 2024-08-02 ENCOUNTER — OFFICE VISIT (OUTPATIENT)
Age: 59
End: 2024-08-02
Payer: MEDICARE

## 2024-08-02 VITALS
OXYGEN SATURATION: 96 % | BODY MASS INDEX: 39.02 KG/M2 | SYSTOLIC BLOOD PRESSURE: 109 MMHG | HEIGHT: 63 IN | WEIGHT: 220.2 LBS | DIASTOLIC BLOOD PRESSURE: 81 MMHG | HEART RATE: 82 BPM | TEMPERATURE: 97.8 F

## 2024-08-02 DIAGNOSIS — Z79.4 TYPE 2 DIABETES MELLITUS WITH HYPERGLYCEMIA, WITH LONG-TERM CURRENT USE OF INSULIN (HCC): Primary | ICD-10-CM

## 2024-08-02 DIAGNOSIS — E11.65 TYPE 2 DIABETES MELLITUS WITH HYPERGLYCEMIA, WITH LONG-TERM CURRENT USE OF INSULIN (HCC): Primary | ICD-10-CM

## 2024-08-02 DIAGNOSIS — E66.01 CLASS 3 SEVERE OBESITY DUE TO EXCESS CALORIES WITH SERIOUS COMORBIDITY AND BODY MASS INDEX (BMI) OF 40.0 TO 44.9 IN ADULT (HCC): ICD-10-CM

## 2024-08-02 DIAGNOSIS — E78.00 HYPERCHOLESTEROLEMIA: ICD-10-CM

## 2024-08-02 LAB
ANION GAP SERPL CALC-SCNC: 6 MMOL/L (ref 5–15)
BUN SERPL-MCNC: 13 MG/DL (ref 6–20)
BUN/CREAT SERPL: 19 (ref 12–20)
CALCIUM SERPL-MCNC: 9.7 MG/DL (ref 8.5–10.1)
CHLORIDE SERPL-SCNC: 94 MMOL/L (ref 97–108)
CO2 SERPL-SCNC: 38 MMOL/L (ref 21–32)
CREAT SERPL-MCNC: 0.7 MG/DL (ref 0.55–1.02)
CREAT UR-MCNC: 156 MG/DL
GLUCOSE SERPL-MCNC: 120 MG/DL (ref 65–100)
HBA1C MFR BLD: 6.1 %
MICROALBUMIN UR-MCNC: 2.39 MG/DL
MICROALBUMIN/CREAT UR-RTO: 15 MG/G (ref 0–30)
POTASSIUM SERPL-SCNC: 3.9 MMOL/L (ref 3.5–5.1)
SODIUM SERPL-SCNC: 138 MMOL/L (ref 136–145)

## 2024-08-02 PROCEDURE — 3074F SYST BP LT 130 MM HG: CPT | Performed by: INTERNAL MEDICINE

## 2024-08-02 PROCEDURE — 2022F DILAT RTA XM EVC RTNOPTHY: CPT | Performed by: INTERNAL MEDICINE

## 2024-08-02 PROCEDURE — 83036 HEMOGLOBIN GLYCOSYLATED A1C: CPT | Performed by: INTERNAL MEDICINE

## 2024-08-02 PROCEDURE — 3046F HEMOGLOBIN A1C LEVEL >9.0%: CPT | Performed by: INTERNAL MEDICINE

## 2024-08-02 PROCEDURE — G8417 CALC BMI ABV UP PARAM F/U: HCPCS | Performed by: INTERNAL MEDICINE

## 2024-08-02 PROCEDURE — 3017F COLORECTAL CA SCREEN DOC REV: CPT | Performed by: INTERNAL MEDICINE

## 2024-08-02 PROCEDURE — G8428 CUR MEDS NOT DOCUMENT: HCPCS | Performed by: INTERNAL MEDICINE

## 2024-08-02 PROCEDURE — 4004F PT TOBACCO SCREEN RCVD TLK: CPT | Performed by: INTERNAL MEDICINE

## 2024-08-02 PROCEDURE — 3079F DIAST BP 80-89 MM HG: CPT | Performed by: INTERNAL MEDICINE

## 2024-08-02 PROCEDURE — 99214 OFFICE O/P EST MOD 30 MIN: CPT | Performed by: INTERNAL MEDICINE

## 2024-08-02 RX ORDER — MONTELUKAST SODIUM 10 MG/1
10 TABLET ORAL NIGHTLY
COMMUNITY
Start: 2024-07-23

## 2024-08-02 NOTE — PROGRESS NOTES
Mindy Olivier is a 59 y.o. female here for   Chief Complaint   Patient presents with    Diabetes       1. Have you been to the ER, urgent care clinic since your last visit?  Hospitalized since your last visit? -NO    2. Have you seen or consulted any other health care providers outside of the [facility] since your last visit?  Include any pap smears or colon screening.-Pt stated she seen her Pulmonologist.      
because of obesity, unable to tolerate metformin and metformin ER  Doing well - C/W Ozempic to 1 mg once a week   I reviewed the benefits of GLP-1 such as improved glycemic control, weight loss, risk reduction in major cardiovascular events such as CAD/ CVA  Ophtho - due  Renal - urine+labs today   Feet-no foot sores reported. Ingrown nails will refer to podiatry     2. Class 3 severe obesity due to excess calories with serious comorbidity and body mass index (BMI) of 40.0 to 44.9 in adult ([facility])  Assessment & Plan:  S/p gastric bypass surgery on 11-.   Lost almost 70 pounds since then then re-gained some weight.   Control COPD to limit use of prednisone.     3. Hypercholesterolemia  Assessment & Plan:  The ASCVD Risk score (Shay LORENZO, et al., 2019) failed to calculate for the following reasons:    Cannot find a previous HDL lab    Cannot find a previous total cholesterol lab  Continue atorvastatin 80 mg daily     1. Type 2 diabetes mellitus with hyperglycemia, with long-term current use of insulin ([facility])  -     AMB POC HEMOGLOBIN A1C  -     [facility] - José Baumann DPM, Podiatry, [place]  -     Basic Metabolic Panel; Future  -     Microalbumin / Creatinine Urine Ratio; Future  2. Class 3 severe obesity due to excess calories with serious comorbidity and body mass index (BMI) of 40.0 to 44.9 in adult ([facility])  3. Hypercholesterolemia    Discussed with patient: unless any ordered testing results are urgent, they will be reviewed at scheduled follow up visit.     Return in about 7 months (around 3/2/2025).    Thank you for allowing me to participate in the care of this patient.

## 2024-08-05 ENCOUNTER — TELEPHONE (OUTPATIENT)
Age: 59
End: 2024-08-05

## 2024-08-05 DIAGNOSIS — Z79.4 TYPE 2 DIABETES MELLITUS WITH HYPERGLYCEMIA, WITH LONG-TERM CURRENT USE OF INSULIN (HCC): Primary | ICD-10-CM

## 2024-08-05 DIAGNOSIS — E11.65 TYPE 2 DIABETES MELLITUS WITH HYPERGLYCEMIA, WITH LONG-TERM CURRENT USE OF INSULIN (HCC): Primary | ICD-10-CM

## 2024-08-05 NOTE — TELEPHONE ENCOUNTER
Informed pt of Dr. Wells's note. Pt verbalized understanding. Pt stated her CO2 is always high because she have COPD and is on continuous oxygen on 4L. Pt stated she was seen at [facility] by Dr. Raman.  Pt stated she is fine and is currently using her oxygen as we speak. Pt stated she will go to [facility] to have labs done.

## 2024-08-05 NOTE — TELEPHONE ENCOUNTER
----- Message from Radha Wells MD sent at 8/5/2024  9:26 AM EDT -----  High CO2 levels. Any symptoms? If not, repeat labs in 2-3 weeks.   Radha Wells MD

## 2024-08-09 ENCOUNTER — TELEPHONE (OUTPATIENT)
Age: 59
End: 2024-08-09

## 2024-08-09 NOTE — TELEPHONE ENCOUNTER
Nick Pugh, from [employer], states, Patient has RX: Ozempic 1MG & .25-.5MG in their  system. Caller wants to know which dosage should patient be taking, and which rx   needs to be deleted from her file?   Please return call to advise.     Thank you.

## 2024-08-09 NOTE — TELEPHONE ENCOUNTER
I called and I spoke to Martin with [facility] and he stated he's not sure why we was called because pt have been on Ozempic 1mg for a while and that's the only order in their system. He provided me with Document number: [id number]

## 2024-08-26 DIAGNOSIS — E11.65 TYPE 2 DIABETES MELLITUS WITH HYPERGLYCEMIA, WITH LONG-TERM CURRENT USE OF INSULIN (HCC): Primary | ICD-10-CM

## 2024-08-26 DIAGNOSIS — Z79.4 TYPE 2 DIABETES MELLITUS WITH HYPERGLYCEMIA, WITH LONG-TERM CURRENT USE OF INSULIN (HCC): Primary | ICD-10-CM

## 2024-08-26 DIAGNOSIS — E78.00 HYPERCHOLESTEROLEMIA: ICD-10-CM

## 2024-08-27 ENCOUNTER — LAB (OUTPATIENT)
Age: 59
End: 2024-08-27

## 2024-08-27 ENCOUNTER — TELEPHONE (OUTPATIENT)
Age: 59
End: 2024-08-27

## 2024-08-27 DIAGNOSIS — Z79.4 TYPE 2 DIABETES MELLITUS WITH HYPERGLYCEMIA, WITH LONG-TERM CURRENT USE OF INSULIN (HCC): ICD-10-CM

## 2024-08-27 DIAGNOSIS — E78.00 HYPERCHOLESTEROLEMIA: ICD-10-CM

## 2024-08-27 DIAGNOSIS — E11.65 TYPE 2 DIABETES MELLITUS WITH HYPERGLYCEMIA, WITH LONG-TERM CURRENT USE OF INSULIN (HCC): ICD-10-CM

## 2024-08-27 LAB
ANION GAP SERPL CALC-SCNC: 3 MMOL/L (ref 5–15)
BUN SERPL-MCNC: 16 MG/DL (ref 6–20)
BUN/CREAT SERPL: 22 (ref 12–20)
CALCIUM SERPL-MCNC: 9.8 MG/DL (ref 8.5–10.1)
CHLORIDE SERPL-SCNC: 100 MMOL/L (ref 97–108)
CO2 SERPL-SCNC: 37 MMOL/L (ref 21–32)
CREAT SERPL-MCNC: 0.72 MG/DL (ref 0.55–1.02)
GLUCOSE SERPL-MCNC: 118 MG/DL (ref 65–100)
POTASSIUM SERPL-SCNC: 3.6 MMOL/L (ref 3.5–5.1)
SODIUM SERPL-SCNC: 140 MMOL/L (ref 136–145)

## 2024-08-27 NOTE — TELEPHONE ENCOUNTER
Pt was called and was made aware that Ozempic was here and ready for . Pt came in for lab appt and was also able to  Ozempic at 9am.

## 2024-11-05 ENCOUNTER — OFFICE VISIT (OUTPATIENT)
Age: 59
End: 2024-11-05
Payer: MEDICARE

## 2024-11-05 VITALS
TEMPERATURE: 97.9 F | WEIGHT: 215.2 LBS | DIASTOLIC BLOOD PRESSURE: 72 MMHG | HEART RATE: 83 BPM | HEIGHT: 63 IN | SYSTOLIC BLOOD PRESSURE: 105 MMHG | OXYGEN SATURATION: 97 % | BODY MASS INDEX: 38.13 KG/M2

## 2024-11-05 DIAGNOSIS — Z79.4 TYPE 2 DIABETES MELLITUS WITH HYPERGLYCEMIA, WITH LONG-TERM CURRENT USE OF INSULIN (HCC): Primary | ICD-10-CM

## 2024-11-05 DIAGNOSIS — E66.01 CLASS 3 SEVERE OBESITY DUE TO EXCESS CALORIES WITH SERIOUS COMORBIDITY AND BODY MASS INDEX (BMI) OF 40.0 TO 44.9 IN ADULT: ICD-10-CM

## 2024-11-05 DIAGNOSIS — E66.813 CLASS 3 SEVERE OBESITY DUE TO EXCESS CALORIES WITH SERIOUS COMORBIDITY AND BODY MASS INDEX (BMI) OF 40.0 TO 44.9 IN ADULT: ICD-10-CM

## 2024-11-05 DIAGNOSIS — E78.00 HYPERCHOLESTEROLEMIA: ICD-10-CM

## 2024-11-05 DIAGNOSIS — E11.65 TYPE 2 DIABETES MELLITUS WITH HYPERGLYCEMIA, WITH LONG-TERM CURRENT USE OF INSULIN (HCC): Primary | ICD-10-CM

## 2024-11-05 PROBLEM — E11.21 TYPE 2 DIABETES MELLITUS WITH DIABETIC NEPHROPATHY, WITHOUT LONG-TERM CURRENT USE OF INSULIN (HCC): Status: ACTIVE | Noted: 2024-11-05

## 2024-11-05 LAB — HBA1C MFR BLD: 6 %

## 2024-11-05 PROCEDURE — 83036 HEMOGLOBIN GLYCOSYLATED A1C: CPT | Performed by: INTERNAL MEDICINE

## 2024-11-05 PROCEDURE — G8484 FLU IMMUNIZE NO ADMIN: HCPCS | Performed by: INTERNAL MEDICINE

## 2024-11-05 PROCEDURE — G8428 CUR MEDS NOT DOCUMENT: HCPCS | Performed by: INTERNAL MEDICINE

## 2024-11-05 PROCEDURE — 2022F DILAT RTA XM EVC RTNOPTHY: CPT | Performed by: INTERNAL MEDICINE

## 2024-11-05 PROCEDURE — G8417 CALC BMI ABV UP PARAM F/U: HCPCS | Performed by: INTERNAL MEDICINE

## 2024-11-05 PROCEDURE — 3046F HEMOGLOBIN A1C LEVEL >9.0%: CPT | Performed by: INTERNAL MEDICINE

## 2024-11-05 PROCEDURE — 4004F PT TOBACCO SCREEN RCVD TLK: CPT | Performed by: INTERNAL MEDICINE

## 2024-11-05 PROCEDURE — 99214 OFFICE O/P EST MOD 30 MIN: CPT | Performed by: INTERNAL MEDICINE

## 2024-11-05 PROCEDURE — 3078F DIAST BP <80 MM HG: CPT | Performed by: INTERNAL MEDICINE

## 2024-11-05 PROCEDURE — 3074F SYST BP LT 130 MM HG: CPT | Performed by: INTERNAL MEDICINE

## 2024-11-05 PROCEDURE — 3017F COLORECTAL CA SCREEN DOC REV: CPT | Performed by: INTERNAL MEDICINE

## 2024-11-05 NOTE — PROGRESS NOTES
Mindy Olivier is a 59 y.o. female here for   Chief Complaint   Patient presents with    Diabetes       1. Have you been to the ER, urgent care clinic since your last visit?  Hospitalized since your last visit? -no    2. Have you seen or consulted any other health care providers outside of the [facility] since your last visit?  Include any pap smears or colon screening.-Pt stated she saw Dr. Rosas and Dr. Traylor and GYN      
index 40.0-44.9, adult 2020    Chronic obstructive lung disease (HCC) 2020    Chronic otitis externa 3/2/2020    Chronic otitis media 2020    Cyst of ovary 2020    Diabetes (HCC)     Diabetes mellitus type 2, controlled (HCC) 2020    Ear problems     Endometriosis 2020    Essential hypertension 2020    High cholesterol     Hypercholesterolemia 2020    Hypertension     Ingrown toenail 2020    Long term (current) use of systemic steroids 2020    Mixed hyperlipidemia 2020    Morbid obesity 2020    Obesity     Obstructive sleep apnea of adult 2020    Tetralogy of Fallot 2020    Uncontrolled type 2 diabetes mellitus 2020        Past Surgical History:   Procedure Laterality Date    BACK SURGERY      BREAST BIOPSY Left     benign     SECTION      x2    GASTRIC BYPASS SURGERY  2021    HYSTERECTOMY (CERVIX STATUS UNKNOWN)      MA UNLISTED PROCEDURE CARDIAC SURGERY      TONSILLECTOMY      TONSILLECTOMY      TUBAL LIGATION          Social History     Tobacco Use    Smoking status: Former     Current packs/day: 0.00     Types: Cigarettes     Quit date: 2018     Years since quittin.1    Smokeless tobacco: Never   Substance Use Topics    Alcohol use: Not Currently      Employer:  No address on file.     Family History   Adopted: Yes   Problem Relation Age of Onset    Diabetes Mother       PHYSICAL EXAM  Blood pressure 105/72, pulse 83, temperature 97.9 °F (36.6 °C), temperature source Temporal, height 1.6 m (5' 3\"), weight 97.6 kg (215 lb 3.2 oz), SpO2 97%.   GENERAL: Well-developed, well-nourished female in no acute distress. Obese   HENT: normocephalic, atraumatic  EYES: EOMI. No lid lag, proptosis, icterus, conjunctival injection, periorbital edema.  CARDIO: Regular rate, no LE edema  RESP: Breathing comfortably. No use of accessory muscles. On supplemental oxygen   GI: Soft, nontender, nondistended.   MSK: no joint swelling hands,

## 2024-11-07 ENCOUNTER — TELEPHONE (OUTPATIENT)
Age: 59
End: 2024-11-07

## 2024-11-07 NOTE — TELEPHONE ENCOUNTER
Patient states paperwork that was sent in, page 8 not signed or dated. Please sign and date and refax. Thank you

## 2024-11-08 NOTE — TELEPHONE ENCOUNTER
Paper work was already signed, just needed to be dated. It was dated and refaxed. Pt was made aware.

## 2024-11-19 ENCOUNTER — TELEPHONE (OUTPATIENT)
Age: 59
End: 2024-11-19

## 2024-11-19 NOTE — TELEPHONE ENCOUNTER
Pt was made aware of arrival of patient assistance (Ozempic). Pt stated she will stop by tomorrow to pick them up.

## 2025-01-10 ENCOUNTER — TRANSCRIBE ORDERS (OUTPATIENT)
Facility: HOSPITAL | Age: 60
End: 2025-01-10

## 2025-01-10 DIAGNOSIS — I71.21 ASCENDING AORTIC ANEURYSM, UNSPECIFIED WHETHER RUPTURED (HCC): Primary | ICD-10-CM

## 2025-02-25 DIAGNOSIS — Z79.4 TYPE 2 DIABETES MELLITUS WITH HYPERGLYCEMIA, WITH LONG-TERM CURRENT USE OF INSULIN (HCC): Primary | ICD-10-CM

## 2025-02-25 DIAGNOSIS — E11.65 TYPE 2 DIABETES MELLITUS WITH HYPERGLYCEMIA, WITH LONG-TERM CURRENT USE OF INSULIN (HCC): Primary | ICD-10-CM

## 2025-02-25 RX ORDER — SEMAGLUTIDE 1.34 MG/ML
1 INJECTION, SOLUTION SUBCUTANEOUS
Qty: 3 ML | Refills: 3 | Status: SHIPPED | OUTPATIENT
Start: 2025-02-25

## 2025-02-25 RX ORDER — SEMAGLUTIDE 0.68 MG/ML
1 INJECTION, SOLUTION SUBCUTANEOUS WEEKLY
Qty: 3 ML | Refills: 3 | Status: SHIPPED | OUTPATIENT
Start: 2025-02-25 | End: 2025-02-25

## 2025-02-25 NOTE — TELEPHONE ENCOUNTER
[facility] received rx for trulicity 0.25 but instructions do not match stating to inject 1mg. Please correct and resend  
Pt was called and stated she is currently taking Ozempic 1mg weekly and [place] [facility] IO give her a voucher to receive medication from [facility] because they are having shipment issues.  
Detail Level: Detailed

## 2025-02-25 NOTE — TELEPHONE ENCOUNTER
Patient needs 1 month supply Ozempic sent to [facility]. She has voucher from company to get it locally.

## 2025-03-09 LAB — HBA1C MFR BLD HPLC: 6.3 %

## 2025-03-18 ENCOUNTER — TELEPHONE (OUTPATIENT)
Age: 60
End: 2025-03-18

## 2025-03-18 NOTE — TELEPHONE ENCOUNTER
Pt was called and made aware that patient assistance(Ozempic) arrived to office. Pt stated she will stop by the office tomorrow to pick it up.

## 2025-04-09 ENCOUNTER — HOSPITAL ENCOUNTER (OUTPATIENT)
Facility: HOSPITAL | Age: 60
Discharge: HOME | End: 2025-04-12
Attending: INTERNAL MEDICINE
Payer: MEDICARE

## 2025-04-09 ENCOUNTER — HOSPITAL ENCOUNTER (OUTPATIENT)
Facility: HOSPITAL | Age: 60
End: 2025-04-09
Attending: INTERNAL MEDICINE
Payer: MEDICARE

## 2025-04-09 DIAGNOSIS — I71.21 ASCENDING AORTIC ANEURYSM, UNSPECIFIED WHETHER RUPTURED: ICD-10-CM

## 2025-04-09 PROCEDURE — 75557 CARDIAC MRI FOR MORPH: CPT | Performed by: INTERNAL MEDICINE

## 2025-04-09 PROCEDURE — 75557 CARDIAC MRI FOR MORPH: CPT

## 2025-04-22 DIAGNOSIS — Q21.3 TETRALOGY OF FALLOT: Primary | ICD-10-CM

## 2025-04-22 DIAGNOSIS — I71.21 ANEURYSM OF ASCENDING AORTA WITHOUT RUPTURE: ICD-10-CM

## 2025-05-05 ENCOUNTER — OFFICE VISIT (OUTPATIENT)
Age: 60
End: 2025-05-05
Payer: MEDICARE

## 2025-05-05 VITALS
DIASTOLIC BLOOD PRESSURE: 70 MMHG | HEIGHT: 63 IN | SYSTOLIC BLOOD PRESSURE: 117 MMHG | TEMPERATURE: 97.2 F | OXYGEN SATURATION: 96 % | BODY MASS INDEX: 38.59 KG/M2 | WEIGHT: 217.8 LBS | HEART RATE: 82 BPM

## 2025-05-05 DIAGNOSIS — E78.00 HYPERCHOLESTEROLEMIA: ICD-10-CM

## 2025-05-05 DIAGNOSIS — E66.01 MORBID OBESITY (HCC): ICD-10-CM

## 2025-05-05 DIAGNOSIS — E11.21 TYPE 2 DIABETES MELLITUS WITH DIABETIC NEPHROPATHY, WITHOUT LONG-TERM CURRENT USE OF INSULIN (HCC): Primary | ICD-10-CM

## 2025-05-05 PROCEDURE — 3074F SYST BP LT 130 MM HG: CPT | Performed by: INTERNAL MEDICINE

## 2025-05-05 PROCEDURE — 99214 OFFICE O/P EST MOD 30 MIN: CPT | Performed by: INTERNAL MEDICINE

## 2025-05-05 PROCEDURE — 3078F DIAST BP <80 MM HG: CPT | Performed by: INTERNAL MEDICINE

## 2025-05-05 PROCEDURE — 4004F PT TOBACCO SCREEN RCVD TLK: CPT | Performed by: INTERNAL MEDICINE

## 2025-05-05 PROCEDURE — 2022F DILAT RTA XM EVC RTNOPTHY: CPT | Performed by: INTERNAL MEDICINE

## 2025-05-05 PROCEDURE — 3046F HEMOGLOBIN A1C LEVEL >9.0%: CPT | Performed by: INTERNAL MEDICINE

## 2025-05-05 PROCEDURE — 3017F COLORECTAL CA SCREEN DOC REV: CPT | Performed by: INTERNAL MEDICINE

## 2025-05-05 PROCEDURE — G8427 DOCREV CUR MEDS BY ELIG CLIN: HCPCS | Performed by: INTERNAL MEDICINE

## 2025-05-05 PROCEDURE — G8417 CALC BMI ABV UP PARAM F/U: HCPCS | Performed by: INTERNAL MEDICINE

## 2025-05-05 RX ORDER — POTASSIUM CHLORIDE 750 MG/1
10 TABLET, EXTENDED RELEASE ORAL DAILY
COMMUNITY

## 2025-05-05 RX ORDER — MOMETASONE FUROATE 1 MG/G
CREAM TOPICAL PRN
COMMUNITY
Start: 2025-02-07

## 2025-05-05 RX ORDER — BUDESONIDE 0.5 MG/2ML
1 INHALANT ORAL PRN
COMMUNITY
Start: 2025-04-29

## 2025-05-05 RX ORDER — METOPROLOL SUCCINATE 50 MG/1
50 TABLET, EXTENDED RELEASE ORAL DAILY
COMMUNITY
Start: 2025-03-22

## 2025-05-05 NOTE — PROGRESS NOTES
Mindy Olivier is a 60 y.o. female here for   Chief Complaint   Patient presents with    Diabetes       1. Have you been to the ER, urgent care clinic since your last visit?  Hospitalized since your last visit? -    2. Have you seen or consulted any other health care providers outside of the [facility] since your last visit?  Include any pap smears or colon screening.-

## 2025-05-05 NOTE — PROGRESS NOTES
Mindy Olivier is a 60 y.o. female here for   Chief Complaint   Patient presents with    Diabetes       1. Have you been to the ER, urgent care clinic since your last visit?  Hospitalized since your last visit? -no    2. Have you seen or consulted any other health care providers outside of the [facility] since your last visit?  Include any pap smears or colon screening.- [facility]- \"a couple weeks ago\" - found one of her valves are dialated, seeing a specialist for possible sx, will find out 6/2/25  and  Dr. Lidia Doss for fatty liver.

## 2025-05-05 NOTE — PROGRESS NOTES
Follow up     PCP: Amanuel Traylor MD    Chief Complaint   Patient presents with    Diabetes     HPI:  Mindy Olivier is a 60 y.o. female with  has a past medical history of Body mass index 40.0-44.9, adult ([facility]), Chronic obstructive lung disease (HCC), Chronic otitis externa, Chronic otitis media, Cyst of ovary, Diabetes (HCC), Diabetes mellitus type 2, controlled ([facility]), Ear problems, Endometriosis, Essential hypertension, High cholesterol, Hypercholesterolemia, Hypertension, Ingrown toenail, Long term (current) use of systemic steroids, Mixed hyperlipidemia, Morbid obesity (HCC), Obesity, Obstructive sleep apnea of adult, Tetralogy of Fallot, and Uncontrolled type 2 diabetes mellitus.here for follow up of diabetes.     patient was diagnosed with diabetes in May 2019 during labs done as a part of annual physical.   Patient had gastric bypass surgery on 11- by Dr. Adhikari.   Since then she has lost more than 70 pounds.  · Past treatment: metformin, metformin ER (nausea and stomach pain), glimepiride, Basaglar, Humalog, V-Go, Januvia   Complications of DM:   · CAD: no. hx CHF secondary to congenital heart disease   · CVA: no   · PVD: no   · Amputations: no    · Retinopathy: no; last exam was February 2020   · Gastropathy: no   · Nephropathy: no   · Neuropathy: no  Medications:   · Statin: Atorvastatin 80   · ACE-I: losartan   · ASA: no    Prednisone 10 mg PRN - for copd on oxygen     DM meds:   Ozempic 1 mg once a week     3/2025   A1c 6.3%  CMP reviewed   To be scanned  [facility]   Cr. 0.76     Lowest glucoses in mid 70s  Discussed this is normal     BRIEF ROS   Gen: no fevers/chills  CV: no chest pain  Resp: +chronic shortness of breath  GI: no nausea, emesis, abdominal pain  Neuro: no confusion     LABS/STUDIES:     Lab Results   Component Value Date/Time    NMX3JFBY 6.0 11/05/2024 10:31 AM    KXT9ZDZH 6.1 08/02/2024 10:41 AM    FLS3RPSI 6.4 07/11/2022 10:14 AM    OBV4EBLB 6.8 04/11/2022 11:12 AM    [id number]

## 2025-06-20 ENCOUNTER — TELEPHONE (OUTPATIENT)
Age: 60
End: 2025-06-20

## 2025-06-20 NOTE — TELEPHONE ENCOUNTER
Pt was made aware that her patient assistance(Ozempic- 4 boxes) has arrived at the office. Pt stated she will pick it up on Monday 06/23/25.